# Patient Record
Sex: MALE | Race: BLACK OR AFRICAN AMERICAN | NOT HISPANIC OR LATINO | Employment: OTHER | URBAN - METROPOLITAN AREA
[De-identification: names, ages, dates, MRNs, and addresses within clinical notes are randomized per-mention and may not be internally consistent; named-entity substitution may affect disease eponyms.]

---

## 2022-07-23 ENCOUNTER — HOSPITAL ENCOUNTER (INPATIENT)
Facility: HOSPITAL | Age: 77
LOS: 13 days | Discharge: HOME | End: 2022-08-05
Attending: PHYSICAL MEDICINE & REHABILITATION | Admitting: PHYSICAL MEDICINE & REHABILITATION

## 2022-07-23 ENCOUNTER — APPOINTMENT (OUTPATIENT)
Dept: OTHER | Facility: HOSPITAL | Age: 77
End: 2022-07-23

## 2022-07-23 DIAGNOSIS — I63.50 RIGHT PONTINE STROKE: ICD-10-CM

## 2022-07-23 DIAGNOSIS — Z09 FOLLOW-UP EXAM: ICD-10-CM

## 2022-07-23 DIAGNOSIS — R26.89 IMPAIRED GAIT AND MOBILITY: Primary | ICD-10-CM

## 2022-07-23 DIAGNOSIS — M10.9 ACUTE GOUT OF LEFT HAND, UNSPECIFIED CAUSE: ICD-10-CM

## 2022-07-23 LAB — SARS-COV-2 ORF1AB RESP QL NAA+PROBE: NOT DETECTED

## 2022-07-23 PROCEDURE — 94640 AIRWAY INHALATION TREATMENT: CPT

## 2022-07-23 PROCEDURE — 94799 UNLISTED PULMONARY SVC/PX: CPT

## 2022-07-23 PROCEDURE — 94664 DEMO&/EVAL PT USE INHALER: CPT

## 2022-07-23 PROCEDURE — U0004 COV-19 TEST NON-CDC HGH THRU: HCPCS | Performed by: PHYSICAL MEDICINE & REHABILITATION

## 2022-07-23 PROCEDURE — U0005 INFEC AGEN DETEC AMPLI PROBE: HCPCS | Performed by: PHYSICAL MEDICINE & REHABILITATION

## 2022-07-23 RX ORDER — NAPROXEN 500 MG/1
500 TABLET ORAL 2 TIMES DAILY WITH MEALS
COMMUNITY
End: 2022-08-05 | Stop reason: HOSPADM

## 2022-07-23 RX ORDER — LORATADINE 10 MG/1
10 CAPSULE, LIQUID FILLED ORAL DAILY
COMMUNITY

## 2022-07-23 RX ORDER — TAMSULOSIN HYDROCHLORIDE 0.4 MG/1
1 CAPSULE ORAL DAILY
COMMUNITY

## 2022-07-23 RX ORDER — ASPIRIN 81 MG/1
81 TABLET ORAL DAILY
Status: DISCONTINUED | OUTPATIENT
Start: 2022-07-24 | End: 2022-08-05 | Stop reason: HOSPADM

## 2022-07-23 RX ORDER — TAMSULOSIN HYDROCHLORIDE 0.4 MG/1
0.4 CAPSULE ORAL DAILY
Status: DISCONTINUED | OUTPATIENT
Start: 2022-07-24 | End: 2022-08-05 | Stop reason: HOSPADM

## 2022-07-23 RX ORDER — HYDROCODONE BITARTRATE AND ACETAMINOPHEN 5; 325 MG/1; MG/1
1 TABLET ORAL EVERY 4 HOURS PRN
Status: DISCONTINUED | OUTPATIENT
Start: 2022-07-23 | End: 2022-08-05 | Stop reason: HOSPADM

## 2022-07-23 RX ORDER — BISACODYL 10 MG
10 SUPPOSITORY, RECTAL RECTAL DAILY PRN
Status: DISCONTINUED | OUTPATIENT
Start: 2022-07-23 | End: 2022-08-05 | Stop reason: HOSPADM

## 2022-07-23 RX ORDER — BUDESONIDE AND FORMOTEROL FUMARATE DIHYDRATE 160; 4.5 UG/1; UG/1
2 AEROSOL RESPIRATORY (INHALATION)
Status: DISCONTINUED | OUTPATIENT
Start: 2022-07-23 | End: 2022-08-05 | Stop reason: HOSPADM

## 2022-07-23 RX ORDER — ACETAMINOPHEN 500 MG
500 TABLET ORAL EVERY 6 HOURS PRN
Status: DISCONTINUED | OUTPATIENT
Start: 2022-07-23 | End: 2022-08-04

## 2022-07-23 RX ORDER — ATORVASTATIN CALCIUM 80 MG/1
80 TABLET, FILM COATED ORAL NIGHTLY
Status: DISCONTINUED | OUTPATIENT
Start: 2022-07-23 | End: 2022-07-27

## 2022-07-23 RX ADMIN — ATORVASTATIN CALCIUM 80 MG: 80 TABLET, FILM COATED ORAL at 21:18

## 2022-07-23 RX ADMIN — BUDESONIDE AND FORMOTEROL FUMARATE DIHYDRATE 2 PUFF: 160; 4.5 AEROSOL RESPIRATORY (INHALATION) at 19:28

## 2022-07-24 ENCOUNTER — APPOINTMENT (OUTPATIENT)
Dept: GENERAL RADIOLOGY | Facility: HOSPITAL | Age: 77
End: 2022-07-24

## 2022-07-24 PROCEDURE — 97110 THERAPEUTIC EXERCISES: CPT

## 2022-07-24 PROCEDURE — 94799 UNLISTED PULMONARY SVC/PX: CPT

## 2022-07-24 PROCEDURE — 97162 PT EVAL MOD COMPLEX 30 MIN: CPT

## 2022-07-24 PROCEDURE — 73130 X-RAY EXAM OF HAND: CPT

## 2022-07-24 PROCEDURE — 94664 DEMO&/EVAL PT USE INHALER: CPT

## 2022-07-24 RX ORDER — AMOXICILLIN 250 MG
2 CAPSULE ORAL DAILY
Status: DISCONTINUED | OUTPATIENT
Start: 2022-07-24 | End: 2022-08-05 | Stop reason: HOSPADM

## 2022-07-24 RX ORDER — DOCUSATE SODIUM 100 MG/1
100 CAPSULE, LIQUID FILLED ORAL 2 TIMES DAILY
Status: DISCONTINUED | OUTPATIENT
Start: 2022-07-24 | End: 2022-08-05 | Stop reason: HOSPADM

## 2022-07-24 RX ADMIN — ATORVASTATIN CALCIUM 80 MG: 80 TABLET, FILM COATED ORAL at 20:48

## 2022-07-24 RX ADMIN — HYDROCODONE BITARTRATE AND ACETAMINOPHEN 1 TABLET: 5; 325 TABLET ORAL at 08:04

## 2022-07-24 RX ADMIN — ACETAMINOPHEN 500 MG: 500 TABLET ORAL at 07:22

## 2022-07-24 RX ADMIN — DICLOFENAC SODIUM 2 G: 10 GEL TOPICAL at 20:49

## 2022-07-24 RX ADMIN — BUDESONIDE AND FORMOTEROL FUMARATE DIHYDRATE 2 PUFF: 160; 4.5 AEROSOL RESPIRATORY (INHALATION) at 20:13

## 2022-07-24 RX ADMIN — DOCUSATE SODIUM 50MG AND SENNOSIDES 8.6MG 2 TABLET: 8.6; 5 TABLET, FILM COATED ORAL at 16:36

## 2022-07-24 RX ADMIN — TAMSULOSIN HYDROCHLORIDE 0.4 MG: 0.4 CAPSULE ORAL at 08:04

## 2022-07-24 RX ADMIN — HYDROCODONE BITARTRATE AND ACETAMINOPHEN 1 TABLET: 5; 325 TABLET ORAL at 22:05

## 2022-07-24 RX ADMIN — BUDESONIDE AND FORMOTEROL FUMARATE DIHYDRATE 2 PUFF: 160; 4.5 AEROSOL RESPIRATORY (INHALATION) at 07:35

## 2022-07-24 RX ADMIN — ASPIRIN 81 MG: 81 TABLET, COATED ORAL at 08:04

## 2022-07-24 RX ADMIN — DOCUSATE SODIUM 100 MG: 100 CAPSULE, LIQUID FILLED ORAL at 20:48

## 2022-07-24 RX ADMIN — HYDROCODONE BITARTRATE AND ACETAMINOPHEN 1 TABLET: 5; 325 TABLET ORAL at 18:03

## 2022-07-25 ENCOUNTER — APPOINTMENT (OUTPATIENT)
Dept: GENERAL RADIOLOGY | Facility: HOSPITAL | Age: 77
End: 2022-07-25

## 2022-07-25 ENCOUNTER — APPOINTMENT (OUTPATIENT)
Dept: CT IMAGING | Facility: HOSPITAL | Age: 77
End: 2022-07-25

## 2022-07-25 LAB
25(OH)D3 SERPL-MCNC: 20 NG/ML (ref 30–100)
ANION GAP SERPL CALCULATED.3IONS-SCNC: 7.9 MMOL/L (ref 5–15)
BASOPHILS # BLD AUTO: 0.04 10*3/MM3 (ref 0–0.2)
BASOPHILS NFR BLD AUTO: 0.8 % (ref 0–1.5)
BUN SERPL-MCNC: 9 MG/DL (ref 8–23)
BUN/CREAT SERPL: 9.9 (ref 7–25)
CALCIUM SPEC-SCNC: 9.3 MG/DL (ref 8.6–10.5)
CHLORIDE SERPL-SCNC: 104 MMOL/L (ref 98–107)
CO2 SERPL-SCNC: 27.1 MMOL/L (ref 22–29)
CREAT SERPL-MCNC: 0.91 MG/DL (ref 0.76–1.27)
DEPRECATED RDW RBC AUTO: 44.7 FL (ref 37–54)
EGFRCR SERPLBLD CKD-EPI 2021: 86.8 ML/MIN/1.73
EOSINOPHIL # BLD AUTO: 0.18 10*3/MM3 (ref 0–0.4)
EOSINOPHIL NFR BLD AUTO: 3.8 % (ref 0.3–6.2)
ERYTHROCYTE [DISTWIDTH] IN BLOOD BY AUTOMATED COUNT: 13.3 % (ref 12.3–15.4)
GLUCOSE SERPL-MCNC: 76 MG/DL (ref 65–99)
HCT VFR BLD AUTO: 47.6 % (ref 37.5–51)
HGB BLD-MCNC: 15.6 G/DL (ref 13–17.7)
IMM GRANULOCYTES # BLD AUTO: 0.02 10*3/MM3 (ref 0–0.05)
IMM GRANULOCYTES NFR BLD AUTO: 0.4 % (ref 0–0.5)
LYMPHOCYTES # BLD AUTO: 1.87 10*3/MM3 (ref 0.7–3.1)
LYMPHOCYTES NFR BLD AUTO: 39.5 % (ref 19.6–45.3)
MCH RBC QN AUTO: 29.5 PG (ref 26.6–33)
MCHC RBC AUTO-ENTMCNC: 32.8 G/DL (ref 31.5–35.7)
MCV RBC AUTO: 90.2 FL (ref 79–97)
MONOCYTES # BLD AUTO: 0.62 10*3/MM3 (ref 0.1–0.9)
MONOCYTES NFR BLD AUTO: 13.1 % (ref 5–12)
NEUTROPHILS NFR BLD AUTO: 2.01 10*3/MM3 (ref 1.7–7)
NEUTROPHILS NFR BLD AUTO: 42.4 % (ref 42.7–76)
NRBC BLD AUTO-RTO: 0 /100 WBC (ref 0–0.2)
PLATELET # BLD AUTO: 240 10*3/MM3 (ref 140–450)
PMV BLD AUTO: 10.1 FL (ref 6–12)
POTASSIUM SERPL-SCNC: 3.9 MMOL/L (ref 3.5–5.2)
RBC # BLD AUTO: 5.28 10*6/MM3 (ref 4.14–5.8)
SODIUM SERPL-SCNC: 139 MMOL/L (ref 136–145)
WBC NRBC COR # BLD: 4.74 10*3/MM3 (ref 3.4–10.8)

## 2022-07-25 PROCEDURE — 97535 SELF CARE MNGMENT TRAINING: CPT

## 2022-07-25 PROCEDURE — 94760 N-INVAS EAR/PLS OXIMETRY 1: CPT

## 2022-07-25 PROCEDURE — 94664 DEMO&/EVAL PT USE INHALER: CPT

## 2022-07-25 PROCEDURE — 73502 X-RAY EXAM HIP UNI 2-3 VIEWS: CPT

## 2022-07-25 PROCEDURE — 94761 N-INVAS EAR/PLS OXIMETRY MLT: CPT

## 2022-07-25 PROCEDURE — 97166 OT EVAL MOD COMPLEX 45 MIN: CPT

## 2022-07-25 PROCEDURE — 94799 UNLISTED PULMONARY SVC/PX: CPT

## 2022-07-25 PROCEDURE — 80048 BASIC METABOLIC PNL TOTAL CA: CPT | Performed by: STUDENT IN AN ORGANIZED HEALTH CARE EDUCATION/TRAINING PROGRAM

## 2022-07-25 PROCEDURE — 97110 THERAPEUTIC EXERCISES: CPT

## 2022-07-25 PROCEDURE — 96125 COGNITIVE TEST BY HC PRO: CPT

## 2022-07-25 PROCEDURE — 25010000002 ENOXAPARIN PER 10 MG: Performed by: PHYSICAL MEDICINE & REHABILITATION

## 2022-07-25 PROCEDURE — 82306 VITAMIN D 25 HYDROXY: CPT | Performed by: STUDENT IN AN ORGANIZED HEALTH CARE EDUCATION/TRAINING PROGRAM

## 2022-07-25 PROCEDURE — 85025 COMPLETE CBC W/AUTO DIFF WBC: CPT | Performed by: STUDENT IN AN ORGANIZED HEALTH CARE EDUCATION/TRAINING PROGRAM

## 2022-07-25 PROCEDURE — 73200 CT UPPER EXTREMITY W/O DYE: CPT

## 2022-07-25 RX ORDER — ERGOCALCIFEROL 1.25 MG/1
50000 CAPSULE ORAL
Status: DISCONTINUED | OUTPATIENT
Start: 2022-07-25 | End: 2022-08-05 | Stop reason: HOSPADM

## 2022-07-25 RX ORDER — ENOXAPARIN SODIUM 100 MG/ML
40 INJECTION SUBCUTANEOUS NIGHTLY
Status: DISCONTINUED | OUTPATIENT
Start: 2022-07-25 | End: 2022-08-05 | Stop reason: HOSPADM

## 2022-07-25 RX ADMIN — DICLOFENAC SODIUM 2 G: 10 GEL TOPICAL at 17:40

## 2022-07-25 RX ADMIN — HYDROCODONE BITARTRATE AND ACETAMINOPHEN 1 TABLET: 5; 325 TABLET ORAL at 22:54

## 2022-07-25 RX ADMIN — ASPIRIN 81 MG: 81 TABLET, COATED ORAL at 07:23

## 2022-07-25 RX ADMIN — HYDROCODONE BITARTRATE AND ACETAMINOPHEN 1 TABLET: 5; 325 TABLET ORAL at 07:23

## 2022-07-25 RX ADMIN — HYDROCODONE BITARTRATE AND ACETAMINOPHEN 1 TABLET: 5; 325 TABLET ORAL at 18:54

## 2022-07-25 RX ADMIN — DICLOFENAC SODIUM 2 G: 10 GEL TOPICAL at 20:16

## 2022-07-25 RX ADMIN — TAMSULOSIN HYDROCHLORIDE 0.4 MG: 0.4 CAPSULE ORAL at 07:24

## 2022-07-25 RX ADMIN — DOCUSATE SODIUM 100 MG: 100 CAPSULE, LIQUID FILLED ORAL at 20:16

## 2022-07-25 RX ADMIN — BUDESONIDE AND FORMOTEROL FUMARATE DIHYDRATE 2 PUFF: 160; 4.5 AEROSOL RESPIRATORY (INHALATION) at 07:41

## 2022-07-25 RX ADMIN — ENOXAPARIN SODIUM 40 MG: 100 INJECTION SUBCUTANEOUS at 20:16

## 2022-07-25 RX ADMIN — DOCUSATE SODIUM 50MG AND SENNOSIDES 8.6MG 2 TABLET: 8.6; 5 TABLET, FILM COATED ORAL at 07:24

## 2022-07-25 RX ADMIN — DICLOFENAC SODIUM 2 G: 10 GEL TOPICAL at 08:35

## 2022-07-25 RX ADMIN — BUDESONIDE AND FORMOTEROL FUMARATE DIHYDRATE 2 PUFF: 160; 4.5 AEROSOL RESPIRATORY (INHALATION) at 19:47

## 2022-07-25 RX ADMIN — DOCUSATE SODIUM 100 MG: 100 CAPSULE, LIQUID FILLED ORAL at 07:23

## 2022-07-25 RX ADMIN — ATORVASTATIN CALCIUM 80 MG: 80 TABLET, FILM COATED ORAL at 20:16

## 2022-07-25 RX ADMIN — DICLOFENAC SODIUM 2 G: 10 GEL TOPICAL at 11:43

## 2022-07-25 RX ADMIN — ERGOCALCIFEROL 50000 UNITS: 1.25 CAPSULE ORAL at 16:28

## 2022-07-26 LAB
URATE SERPL-MCNC: 6.6 MG/DL (ref 3.4–7)
VIT B12 BLD-MCNC: 659 PG/ML (ref 211–946)

## 2022-07-26 PROCEDURE — 97129 THER IVNTJ 1ST 15 MIN: CPT

## 2022-07-26 PROCEDURE — 94799 UNLISTED PULMONARY SVC/PX: CPT

## 2022-07-26 PROCEDURE — 84550 ASSAY OF BLOOD/URIC ACID: CPT | Performed by: PHYSICAL MEDICINE & REHABILITATION

## 2022-07-26 PROCEDURE — 25010000002 ENOXAPARIN PER 10 MG: Performed by: PHYSICAL MEDICINE & REHABILITATION

## 2022-07-26 PROCEDURE — 94664 DEMO&/EVAL PT USE INHALER: CPT

## 2022-07-26 PROCEDURE — 97535 SELF CARE MNGMENT TRAINING: CPT

## 2022-07-26 PROCEDURE — 97130 THER IVNTJ EA ADDL 15 MIN: CPT

## 2022-07-26 PROCEDURE — 97110 THERAPEUTIC EXERCISES: CPT

## 2022-07-26 PROCEDURE — 82607 VITAMIN B-12: CPT | Performed by: PHYSICAL MEDICINE & REHABILITATION

## 2022-07-26 PROCEDURE — 97112 NEUROMUSCULAR REEDUCATION: CPT

## 2022-07-26 RX ADMIN — HYDROCODONE BITARTRATE AND ACETAMINOPHEN 1 TABLET: 5; 325 TABLET ORAL at 08:50

## 2022-07-26 RX ADMIN — ENOXAPARIN SODIUM 40 MG: 100 INJECTION SUBCUTANEOUS at 20:08

## 2022-07-26 RX ADMIN — ASPIRIN 81 MG: 81 TABLET, COATED ORAL at 08:49

## 2022-07-26 RX ADMIN — BUDESONIDE AND FORMOTEROL FUMARATE DIHYDRATE 2 PUFF: 160; 4.5 AEROSOL RESPIRATORY (INHALATION) at 07:15

## 2022-07-26 RX ADMIN — ATORVASTATIN CALCIUM 80 MG: 80 TABLET, FILM COATED ORAL at 20:08

## 2022-07-26 RX ADMIN — DICLOFENAC SODIUM 2 G: 10 GEL TOPICAL at 11:26

## 2022-07-26 RX ADMIN — DICLOFENAC SODIUM 2 G: 10 GEL TOPICAL at 20:10

## 2022-07-26 RX ADMIN — DOCUSATE SODIUM 100 MG: 100 CAPSULE, LIQUID FILLED ORAL at 20:08

## 2022-07-26 RX ADMIN — TAMSULOSIN HYDROCHLORIDE 0.4 MG: 0.4 CAPSULE ORAL at 08:49

## 2022-07-26 RX ADMIN — BUDESONIDE AND FORMOTEROL FUMARATE DIHYDRATE 2 PUFF: 160; 4.5 AEROSOL RESPIRATORY (INHALATION) at 19:38

## 2022-07-26 RX ADMIN — DICLOFENAC SODIUM 2 G: 10 GEL TOPICAL at 08:50

## 2022-07-27 LAB
BASOPHILS # BLD AUTO: 0.04 10*3/MM3 (ref 0–0.2)
BASOPHILS NFR BLD AUTO: 0.6 % (ref 0–1.5)
CRP SERPL-MCNC: 10.28 MG/DL (ref 0–0.5)
DEPRECATED RDW RBC AUTO: 42.3 FL (ref 37–54)
EOSINOPHIL # BLD AUTO: 0.14 10*3/MM3 (ref 0–0.4)
EOSINOPHIL NFR BLD AUTO: 2.1 % (ref 0.3–6.2)
ERYTHROCYTE [DISTWIDTH] IN BLOOD BY AUTOMATED COUNT: 13.4 % (ref 12.3–15.4)
ERYTHROCYTE [SEDIMENTATION RATE] IN BLOOD: 53 MM/HR (ref 0–20)
HCT VFR BLD AUTO: 46.6 % (ref 37.5–51)
HGB BLD-MCNC: 15.9 G/DL (ref 13–17.7)
IMM GRANULOCYTES # BLD AUTO: 0.01 10*3/MM3 (ref 0–0.05)
IMM GRANULOCYTES NFR BLD AUTO: 0.1 % (ref 0–0.5)
IRON 24H UR-MRATE: 29 MCG/DL (ref 59–158)
IRON SATN MFR SERPL: 11 % (ref 20–50)
LYMPHOCYTES # BLD AUTO: 1.82 10*3/MM3 (ref 0.7–3.1)
LYMPHOCYTES NFR BLD AUTO: 27 % (ref 19.6–45.3)
MAGNESIUM SERPL-MCNC: 1.9 MG/DL (ref 1.6–2.4)
MCH RBC QN AUTO: 29.9 PG (ref 26.6–33)
MCHC RBC AUTO-ENTMCNC: 34.1 G/DL (ref 31.5–35.7)
MCV RBC AUTO: 87.8 FL (ref 79–97)
MONOCYTES # BLD AUTO: 1 10*3/MM3 (ref 0.1–0.9)
MONOCYTES NFR BLD AUTO: 14.8 % (ref 5–12)
NEUTROPHILS NFR BLD AUTO: 3.74 10*3/MM3 (ref 1.7–7)
NEUTROPHILS NFR BLD AUTO: 55.4 % (ref 42.7–76)
NRBC BLD AUTO-RTO: 0 /100 WBC (ref 0–0.2)
PLATELET # BLD AUTO: 274 10*3/MM3 (ref 140–450)
PMV BLD AUTO: 9.7 FL (ref 6–12)
RBC # BLD AUTO: 5.31 10*6/MM3 (ref 4.14–5.8)
TIBC SERPL-MCNC: 276 MCG/DL (ref 298–536)
TRANSFERRIN SERPL-MCNC: 185 MG/DL (ref 200–360)
WBC NRBC COR # BLD: 6.75 10*3/MM3 (ref 3.4–10.8)

## 2022-07-27 PROCEDURE — 86140 C-REACTIVE PROTEIN: CPT | Performed by: PHYSICAL MEDICINE & REHABILITATION

## 2022-07-27 PROCEDURE — 94799 UNLISTED PULMONARY SVC/PX: CPT

## 2022-07-27 PROCEDURE — 97129 THER IVNTJ 1ST 15 MIN: CPT

## 2022-07-27 PROCEDURE — 84466 ASSAY OF TRANSFERRIN: CPT | Performed by: PHYSICAL MEDICINE & REHABILITATION

## 2022-07-27 PROCEDURE — 25010000002 ENOXAPARIN PER 10 MG: Performed by: PHYSICAL MEDICINE & REHABILITATION

## 2022-07-27 PROCEDURE — 83735 ASSAY OF MAGNESIUM: CPT | Performed by: PHYSICAL MEDICINE & REHABILITATION

## 2022-07-27 PROCEDURE — 97130 THER IVNTJ EA ADDL 15 MIN: CPT

## 2022-07-27 PROCEDURE — 83540 ASSAY OF IRON: CPT | Performed by: PHYSICAL MEDICINE & REHABILITATION

## 2022-07-27 PROCEDURE — 85025 COMPLETE CBC W/AUTO DIFF WBC: CPT | Performed by: PHYSICAL MEDICINE & REHABILITATION

## 2022-07-27 PROCEDURE — 97535 SELF CARE MNGMENT TRAINING: CPT

## 2022-07-27 PROCEDURE — 85652 RBC SED RATE AUTOMATED: CPT | Performed by: PHYSICAL MEDICINE & REHABILITATION

## 2022-07-27 PROCEDURE — 97110 THERAPEUTIC EXERCISES: CPT

## 2022-07-27 PROCEDURE — 94664 DEMO&/EVAL PT USE INHALER: CPT

## 2022-07-27 RX ORDER — ATORVASTATIN CALCIUM 80 MG/1
80 TABLET, FILM COATED ORAL NIGHTLY
Status: DISCONTINUED | OUTPATIENT
Start: 2022-08-02 | End: 2022-08-01

## 2022-07-27 RX ORDER — COLCHICINE 0.6 MG/1
0.6 TABLET ORAL EVERY 6 HOURS
Status: COMPLETED | OUTPATIENT
Start: 2022-07-27 | End: 2022-07-27

## 2022-07-27 RX ORDER — COLCHICINE 0.6 MG/1
0.6 TABLET ORAL DAILY
Status: COMPLETED | OUTPATIENT
Start: 2022-07-28 | End: 2022-08-01

## 2022-07-27 RX ADMIN — ASPIRIN 81 MG: 81 TABLET, COATED ORAL at 07:42

## 2022-07-27 RX ADMIN — BUDESONIDE AND FORMOTEROL FUMARATE DIHYDRATE 2 PUFF: 160; 4.5 AEROSOL RESPIRATORY (INHALATION) at 07:25

## 2022-07-27 RX ADMIN — HYDROCODONE BITARTRATE AND ACETAMINOPHEN 1 TABLET: 5; 325 TABLET ORAL at 20:37

## 2022-07-27 RX ADMIN — DICLOFENAC SODIUM 2 G: 10 GEL TOPICAL at 18:16

## 2022-07-27 RX ADMIN — HYDROCODONE BITARTRATE AND ACETAMINOPHEN 1 TABLET: 5; 325 TABLET ORAL at 07:42

## 2022-07-27 RX ADMIN — ENOXAPARIN SODIUM 40 MG: 100 INJECTION SUBCUTANEOUS at 20:37

## 2022-07-27 RX ADMIN — TAMSULOSIN HYDROCHLORIDE 0.4 MG: 0.4 CAPSULE ORAL at 07:43

## 2022-07-27 RX ADMIN — DICLOFENAC SODIUM 2 G: 10 GEL TOPICAL at 20:38

## 2022-07-27 RX ADMIN — COLCHICINE 0.6 MG: 0.6 TABLET, FILM COATED ORAL at 12:37

## 2022-07-27 RX ADMIN — COLCHICINE 0.6 MG: 0.6 TABLET, FILM COATED ORAL at 20:37

## 2022-07-27 RX ADMIN — BUDESONIDE AND FORMOTEROL FUMARATE DIHYDRATE 2 PUFF: 160; 4.5 AEROSOL RESPIRATORY (INHALATION) at 19:47

## 2022-07-27 RX ADMIN — DOCUSATE SODIUM 100 MG: 100 CAPSULE, LIQUID FILLED ORAL at 20:37

## 2022-07-28 LAB
ANION GAP SERPL CALCULATED.3IONS-SCNC: 10.3 MMOL/L (ref 5–15)
BASOPHILS # BLD AUTO: 0.06 10*3/MM3 (ref 0–0.2)
BASOPHILS NFR BLD AUTO: 1 % (ref 0–1.5)
BUN SERPL-MCNC: 12 MG/DL (ref 8–23)
BUN/CREAT SERPL: 12.4 (ref 7–25)
CALCIUM SPEC-SCNC: 9.1 MG/DL (ref 8.6–10.5)
CHLORIDE SERPL-SCNC: 103 MMOL/L (ref 98–107)
CO2 SERPL-SCNC: 25.7 MMOL/L (ref 22–29)
CREAT SERPL-MCNC: 0.97 MG/DL (ref 0.76–1.27)
DEPRECATED RDW RBC AUTO: 41.8 FL (ref 37–54)
EGFRCR SERPLBLD CKD-EPI 2021: 80.4 ML/MIN/1.73
EOSINOPHIL # BLD AUTO: 0.17 10*3/MM3 (ref 0–0.4)
EOSINOPHIL NFR BLD AUTO: 2.8 % (ref 0.3–6.2)
ERYTHROCYTE [DISTWIDTH] IN BLOOD BY AUTOMATED COUNT: 13.2 % (ref 12.3–15.4)
GLUCOSE SERPL-MCNC: 92 MG/DL (ref 65–99)
HCT VFR BLD AUTO: 45.6 % (ref 37.5–51)
HGB BLD-MCNC: 15.5 G/DL (ref 13–17.7)
IMM GRANULOCYTES # BLD AUTO: 0.02 10*3/MM3 (ref 0–0.05)
IMM GRANULOCYTES NFR BLD AUTO: 0.3 % (ref 0–0.5)
LYMPHOCYTES # BLD AUTO: 2.21 10*3/MM3 (ref 0.7–3.1)
LYMPHOCYTES NFR BLD AUTO: 35.9 % (ref 19.6–45.3)
MCH RBC QN AUTO: 29.8 PG (ref 26.6–33)
MCHC RBC AUTO-ENTMCNC: 34 G/DL (ref 31.5–35.7)
MCV RBC AUTO: 87.5 FL (ref 79–97)
MONOCYTES # BLD AUTO: 0.97 10*3/MM3 (ref 0.1–0.9)
MONOCYTES NFR BLD AUTO: 15.7 % (ref 5–12)
NEUTROPHILS NFR BLD AUTO: 2.73 10*3/MM3 (ref 1.7–7)
NEUTROPHILS NFR BLD AUTO: 44.3 % (ref 42.7–76)
NRBC BLD AUTO-RTO: 0 /100 WBC (ref 0–0.2)
PLATELET # BLD AUTO: 293 10*3/MM3 (ref 140–450)
PMV BLD AUTO: 9.9 FL (ref 6–12)
POTASSIUM SERPL-SCNC: 4.1 MMOL/L (ref 3.5–5.2)
RBC # BLD AUTO: 5.21 10*6/MM3 (ref 4.14–5.8)
SODIUM SERPL-SCNC: 139 MMOL/L (ref 136–145)
T4 FREE SERPL-MCNC: 1.45 NG/DL (ref 0.93–1.7)
TSH SERPL DL<=0.05 MIU/L-ACNC: 7.11 UIU/ML (ref 0.27–4.2)
WBC NRBC COR # BLD: 6.16 10*3/MM3 (ref 3.4–10.8)

## 2022-07-28 PROCEDURE — 80048 BASIC METABOLIC PNL TOTAL CA: CPT | Performed by: PHYSICAL MEDICINE & REHABILITATION

## 2022-07-28 PROCEDURE — 97110 THERAPEUTIC EXERCISES: CPT

## 2022-07-28 PROCEDURE — 97129 THER IVNTJ 1ST 15 MIN: CPT

## 2022-07-28 PROCEDURE — 25010000002 ENOXAPARIN PER 10 MG: Performed by: PHYSICAL MEDICINE & REHABILITATION

## 2022-07-28 PROCEDURE — 85025 COMPLETE CBC W/AUTO DIFF WBC: CPT | Performed by: PHYSICAL MEDICINE & REHABILITATION

## 2022-07-28 PROCEDURE — 84439 ASSAY OF FREE THYROXINE: CPT | Performed by: PHYSICAL MEDICINE & REHABILITATION

## 2022-07-28 PROCEDURE — 84443 ASSAY THYROID STIM HORMONE: CPT | Performed by: PHYSICAL MEDICINE & REHABILITATION

## 2022-07-28 PROCEDURE — 97130 THER IVNTJ EA ADDL 15 MIN: CPT

## 2022-07-28 PROCEDURE — 97535 SELF CARE MNGMENT TRAINING: CPT

## 2022-07-28 PROCEDURE — 94799 UNLISTED PULMONARY SVC/PX: CPT

## 2022-07-28 PROCEDURE — 97530 THERAPEUTIC ACTIVITIES: CPT

## 2022-07-28 RX ADMIN — BUDESONIDE AND FORMOTEROL FUMARATE DIHYDRATE 2 PUFF: 160; 4.5 AEROSOL RESPIRATORY (INHALATION) at 07:22

## 2022-07-28 RX ADMIN — ENOXAPARIN SODIUM 40 MG: 100 INJECTION SUBCUTANEOUS at 20:20

## 2022-07-28 RX ADMIN — DICLOFENAC SODIUM 2 G: 10 GEL TOPICAL at 05:30

## 2022-07-28 RX ADMIN — DICLOFENAC SODIUM 2 G: 10 GEL TOPICAL at 11:47

## 2022-07-28 RX ADMIN — HYDROCODONE BITARTRATE AND ACETAMINOPHEN 1 TABLET: 5; 325 TABLET ORAL at 20:20

## 2022-07-28 RX ADMIN — HYDROCODONE BITARTRATE AND ACETAMINOPHEN 1 TABLET: 5; 325 TABLET ORAL at 08:10

## 2022-07-28 RX ADMIN — DICLOFENAC SODIUM 2 G: 10 GEL TOPICAL at 17:09

## 2022-07-28 RX ADMIN — DICLOFENAC SODIUM 2 G: 10 GEL TOPICAL at 20:21

## 2022-07-28 RX ADMIN — BUDESONIDE AND FORMOTEROL FUMARATE DIHYDRATE 2 PUFF: 160; 4.5 AEROSOL RESPIRATORY (INHALATION) at 20:14

## 2022-07-28 RX ADMIN — COLCHICINE 0.6 MG: 0.6 TABLET, FILM COATED ORAL at 10:21

## 2022-07-28 RX ADMIN — DICLOFENAC SODIUM 2 G: 10 GEL TOPICAL at 08:10

## 2022-07-28 RX ADMIN — ASPIRIN 81 MG: 81 TABLET, COATED ORAL at 08:10

## 2022-07-28 RX ADMIN — TAMSULOSIN HYDROCHLORIDE 0.4 MG: 0.4 CAPSULE ORAL at 08:10

## 2022-07-29 PROCEDURE — 97110 THERAPEUTIC EXERCISES: CPT

## 2022-07-29 PROCEDURE — 97130 THER IVNTJ EA ADDL 15 MIN: CPT | Performed by: SPEECH-LANGUAGE PATHOLOGIST

## 2022-07-29 PROCEDURE — 97129 THER IVNTJ 1ST 15 MIN: CPT | Performed by: SPEECH-LANGUAGE PATHOLOGIST

## 2022-07-29 PROCEDURE — 25010000002 ENOXAPARIN PER 10 MG: Performed by: PHYSICAL MEDICINE & REHABILITATION

## 2022-07-29 PROCEDURE — 97535 SELF CARE MNGMENT TRAINING: CPT

## 2022-07-29 PROCEDURE — 94799 UNLISTED PULMONARY SVC/PX: CPT

## 2022-07-29 RX ADMIN — TAMSULOSIN HYDROCHLORIDE 0.4 MG: 0.4 CAPSULE ORAL at 08:10

## 2022-07-29 RX ADMIN — ASPIRIN 81 MG: 81 TABLET, COATED ORAL at 08:11

## 2022-07-29 RX ADMIN — BUDESONIDE AND FORMOTEROL FUMARATE DIHYDRATE 2 PUFF: 160; 4.5 AEROSOL RESPIRATORY (INHALATION) at 07:47

## 2022-07-29 RX ADMIN — ENOXAPARIN SODIUM 40 MG: 100 INJECTION SUBCUTANEOUS at 20:26

## 2022-07-29 RX ADMIN — BUDESONIDE AND FORMOTEROL FUMARATE DIHYDRATE 2 PUFF: 160; 4.5 AEROSOL RESPIRATORY (INHALATION) at 19:35

## 2022-07-29 RX ADMIN — DICLOFENAC SODIUM 2 G: 10 GEL TOPICAL at 17:48

## 2022-07-29 RX ADMIN — HYDROCODONE BITARTRATE AND ACETAMINOPHEN 1 TABLET: 5; 325 TABLET ORAL at 20:26

## 2022-07-29 RX ADMIN — COLCHICINE 0.6 MG: 0.6 TABLET, FILM COATED ORAL at 08:10

## 2022-07-29 RX ADMIN — DICLOFENAC SODIUM 2 G: 10 GEL TOPICAL at 20:27

## 2022-07-29 RX ADMIN — DICLOFENAC SODIUM 2 G: 10 GEL TOPICAL at 08:11

## 2022-07-29 RX ADMIN — HYDROCODONE BITARTRATE AND ACETAMINOPHEN 1 TABLET: 5; 325 TABLET ORAL at 08:10

## 2022-07-29 RX ADMIN — DICLOFENAC SODIUM 2 G: 10 GEL TOPICAL at 12:03

## 2022-07-30 PROCEDURE — 94799 UNLISTED PULMONARY SVC/PX: CPT

## 2022-07-30 PROCEDURE — 25010000002 ENOXAPARIN PER 10 MG: Performed by: PHYSICAL MEDICINE & REHABILITATION

## 2022-07-30 PROCEDURE — 97110 THERAPEUTIC EXERCISES: CPT

## 2022-07-30 RX ADMIN — DICLOFENAC SODIUM 2 G: 10 GEL TOPICAL at 17:23

## 2022-07-30 RX ADMIN — ASPIRIN 81 MG: 81 TABLET, COATED ORAL at 08:52

## 2022-07-30 RX ADMIN — TAMSULOSIN HYDROCHLORIDE 0.4 MG: 0.4 CAPSULE ORAL at 08:52

## 2022-07-30 RX ADMIN — DICLOFENAC SODIUM 2 G: 10 GEL TOPICAL at 07:37

## 2022-07-30 RX ADMIN — COLCHICINE 0.6 MG: 0.6 TABLET, FILM COATED ORAL at 08:52

## 2022-07-30 RX ADMIN — BUDESONIDE AND FORMOTEROL FUMARATE DIHYDRATE 2 PUFF: 160; 4.5 AEROSOL RESPIRATORY (INHALATION) at 20:13

## 2022-07-30 RX ADMIN — HYDROCODONE BITARTRATE AND ACETAMINOPHEN 1 TABLET: 5; 325 TABLET ORAL at 20:16

## 2022-07-30 RX ADMIN — DICLOFENAC SODIUM 2 G: 10 GEL TOPICAL at 11:48

## 2022-07-30 RX ADMIN — BUDESONIDE AND FORMOTEROL FUMARATE DIHYDRATE 2 PUFF: 160; 4.5 AEROSOL RESPIRATORY (INHALATION) at 07:00

## 2022-07-30 RX ADMIN — ENOXAPARIN SODIUM 40 MG: 100 INJECTION SUBCUTANEOUS at 20:16

## 2022-07-30 RX ADMIN — DICLOFENAC SODIUM 2 G: 10 GEL TOPICAL at 20:16

## 2022-07-31 PROCEDURE — 94799 UNLISTED PULMONARY SVC/PX: CPT

## 2022-07-31 PROCEDURE — 25010000002 ENOXAPARIN PER 10 MG: Performed by: PHYSICAL MEDICINE & REHABILITATION

## 2022-07-31 RX ADMIN — DICLOFENAC SODIUM 2 G: 10 GEL TOPICAL at 09:36

## 2022-07-31 RX ADMIN — BUDESONIDE AND FORMOTEROL FUMARATE DIHYDRATE 2 PUFF: 160; 4.5 AEROSOL RESPIRATORY (INHALATION) at 20:05

## 2022-07-31 RX ADMIN — HYDROCODONE BITARTRATE AND ACETAMINOPHEN 1 TABLET: 5; 325 TABLET ORAL at 22:22

## 2022-07-31 RX ADMIN — TAMSULOSIN HYDROCHLORIDE 0.4 MG: 0.4 CAPSULE ORAL at 09:36

## 2022-07-31 RX ADMIN — DICLOFENAC SODIUM 2 G: 10 GEL TOPICAL at 22:22

## 2022-07-31 RX ADMIN — DICLOFENAC SODIUM 2 G: 10 GEL TOPICAL at 17:36

## 2022-07-31 RX ADMIN — ENOXAPARIN SODIUM 40 MG: 100 INJECTION SUBCUTANEOUS at 22:22

## 2022-07-31 RX ADMIN — DOCUSATE SODIUM 100 MG: 100 CAPSULE, LIQUID FILLED ORAL at 22:26

## 2022-07-31 RX ADMIN — ASPIRIN 81 MG: 81 TABLET, COATED ORAL at 09:36

## 2022-07-31 RX ADMIN — COLCHICINE 0.6 MG: 0.6 TABLET, FILM COATED ORAL at 09:36

## 2022-07-31 RX ADMIN — BUDESONIDE AND FORMOTEROL FUMARATE DIHYDRATE 2 PUFF: 160; 4.5 AEROSOL RESPIRATORY (INHALATION) at 12:04

## 2022-07-31 RX ADMIN — DICLOFENAC SODIUM 2 G: 10 GEL TOPICAL at 12:13

## 2022-08-01 LAB
ANION GAP SERPL CALCULATED.3IONS-SCNC: 7.1 MMOL/L (ref 5–15)
BASOPHILS # BLD AUTO: 0.05 10*3/MM3 (ref 0–0.2)
BASOPHILS NFR BLD AUTO: 1.1 % (ref 0–1.5)
BUN SERPL-MCNC: 10 MG/DL (ref 8–23)
BUN/CREAT SERPL: 10.6 (ref 7–25)
CALCIUM SPEC-SCNC: 9.2 MG/DL (ref 8.6–10.5)
CHLORIDE SERPL-SCNC: 103 MMOL/L (ref 98–107)
CO2 SERPL-SCNC: 26.9 MMOL/L (ref 22–29)
CREAT SERPL-MCNC: 0.94 MG/DL (ref 0.76–1.27)
DEPRECATED RDW RBC AUTO: 40.9 FL (ref 37–54)
EGFRCR SERPLBLD CKD-EPI 2021: 83.5 ML/MIN/1.73
EOSINOPHIL # BLD AUTO: 0.16 10*3/MM3 (ref 0–0.4)
EOSINOPHIL NFR BLD AUTO: 3.5 % (ref 0.3–6.2)
ERYTHROCYTE [DISTWIDTH] IN BLOOD BY AUTOMATED COUNT: 13 % (ref 12.3–15.4)
GLUCOSE SERPL-MCNC: 82 MG/DL (ref 65–99)
HCT VFR BLD AUTO: 44.5 % (ref 37.5–51)
HGB BLD-MCNC: 15.3 G/DL (ref 13–17.7)
IMM GRANULOCYTES # BLD AUTO: 0.01 10*3/MM3 (ref 0–0.05)
IMM GRANULOCYTES NFR BLD AUTO: 0.2 % (ref 0–0.5)
LYMPHOCYTES # BLD AUTO: 2.19 10*3/MM3 (ref 0.7–3.1)
LYMPHOCYTES NFR BLD AUTO: 47.4 % (ref 19.6–45.3)
MCH RBC QN AUTO: 30.1 PG (ref 26.6–33)
MCHC RBC AUTO-ENTMCNC: 34.4 G/DL (ref 31.5–35.7)
MCV RBC AUTO: 87.4 FL (ref 79–97)
MONOCYTES # BLD AUTO: 0.65 10*3/MM3 (ref 0.1–0.9)
MONOCYTES NFR BLD AUTO: 14.1 % (ref 5–12)
NEUTROPHILS NFR BLD AUTO: 1.56 10*3/MM3 (ref 1.7–7)
NEUTROPHILS NFR BLD AUTO: 33.7 % (ref 42.7–76)
NRBC BLD AUTO-RTO: 0 /100 WBC (ref 0–0.2)
PLATELET # BLD AUTO: 316 10*3/MM3 (ref 140–450)
PMV BLD AUTO: 9.5 FL (ref 6–12)
POTASSIUM SERPL-SCNC: 4 MMOL/L (ref 3.5–5.2)
RBC # BLD AUTO: 5.09 10*6/MM3 (ref 4.14–5.8)
SODIUM SERPL-SCNC: 137 MMOL/L (ref 136–145)
WBC NRBC COR # BLD: 4.62 10*3/MM3 (ref 3.4–10.8)

## 2022-08-01 PROCEDURE — 85025 COMPLETE CBC W/AUTO DIFF WBC: CPT | Performed by: PHYSICAL MEDICINE & REHABILITATION

## 2022-08-01 PROCEDURE — 80048 BASIC METABOLIC PNL TOTAL CA: CPT | Performed by: PHYSICAL MEDICINE & REHABILITATION

## 2022-08-01 PROCEDURE — 97129 THER IVNTJ 1ST 15 MIN: CPT

## 2022-08-01 PROCEDURE — 94799 UNLISTED PULMONARY SVC/PX: CPT

## 2022-08-01 PROCEDURE — 94760 N-INVAS EAR/PLS OXIMETRY 1: CPT

## 2022-08-01 PROCEDURE — 97535 SELF CARE MNGMENT TRAINING: CPT

## 2022-08-01 PROCEDURE — 97110 THERAPEUTIC EXERCISES: CPT

## 2022-08-01 PROCEDURE — 94761 N-INVAS EAR/PLS OXIMETRY MLT: CPT

## 2022-08-01 PROCEDURE — 97130 THER IVNTJ EA ADDL 15 MIN: CPT

## 2022-08-01 PROCEDURE — 25010000002 ENOXAPARIN PER 10 MG: Performed by: PHYSICAL MEDICINE & REHABILITATION

## 2022-08-01 RX ORDER — COLCHICINE 0.6 MG/1
0.6 TABLET ORAL DAILY
Status: COMPLETED | OUTPATIENT
Start: 2022-08-02 | End: 2022-08-03

## 2022-08-01 RX ORDER — ATORVASTATIN CALCIUM 80 MG/1
80 TABLET, FILM COATED ORAL NIGHTLY
Status: DISCONTINUED | OUTPATIENT
Start: 2022-08-04 | End: 2022-08-05

## 2022-08-01 RX ADMIN — DOCUSATE SODIUM 100 MG: 100 CAPSULE, LIQUID FILLED ORAL at 20:13

## 2022-08-01 RX ADMIN — DICLOFENAC SODIUM 2 G: 10 GEL TOPICAL at 20:14

## 2022-08-01 RX ADMIN — ENOXAPARIN SODIUM 40 MG: 100 INJECTION SUBCUTANEOUS at 20:13

## 2022-08-01 RX ADMIN — DICLOFENAC SODIUM 2 G: 10 GEL TOPICAL at 12:06

## 2022-08-01 RX ADMIN — BUDESONIDE AND FORMOTEROL FUMARATE DIHYDRATE 2 PUFF: 160; 4.5 AEROSOL RESPIRATORY (INHALATION) at 20:35

## 2022-08-01 RX ADMIN — DOCUSATE SODIUM 100 MG: 100 CAPSULE, LIQUID FILLED ORAL at 08:30

## 2022-08-01 RX ADMIN — TAMSULOSIN HYDROCHLORIDE 0.4 MG: 0.4 CAPSULE ORAL at 08:30

## 2022-08-01 RX ADMIN — DICLOFENAC SODIUM 2 G: 10 GEL TOPICAL at 08:30

## 2022-08-01 RX ADMIN — ASPIRIN 81 MG: 81 TABLET, COATED ORAL at 08:30

## 2022-08-01 RX ADMIN — COLCHICINE 0.6 MG: 0.6 TABLET, FILM COATED ORAL at 08:30

## 2022-08-01 RX ADMIN — ERGOCALCIFEROL 50000 UNITS: 1.25 CAPSULE ORAL at 17:35

## 2022-08-01 RX ADMIN — DICLOFENAC SODIUM 2 G: 10 GEL TOPICAL at 17:36

## 2022-08-01 RX ADMIN — BUDESONIDE AND FORMOTEROL FUMARATE DIHYDRATE 2 PUFF: 160; 4.5 AEROSOL RESPIRATORY (INHALATION) at 08:10

## 2022-08-01 RX ADMIN — HYDROCODONE BITARTRATE AND ACETAMINOPHEN 1 TABLET: 5; 325 TABLET ORAL at 20:13

## 2022-08-01 NOTE — PROGRESS NOTES
Case Management  Inpatient Rehabilitation Plan of Care and Discharge Plan Note    Rehabilitation Diagnosis:  Branch  Date of Onset:  Nancy    Medical Summary:  Nancy  Past Medical History: Nancy    Plan of Care  Updated Problems/Interventions  Field    Expected Intensity:  Nancy  Interdisciplinary Team:  Nancy  Estimated Length of Stay/Anticipated Discharge Date: Branch  Anticipated Discharge Destination:  Anticipated discharge destination from inpatient rehabilitation is community  discharge with assistance. Patient lives with his wife in a quad level home with  2 steps to enter. Patient's bedroom and main bathroom are on the 2nd floor, but  patient will be moving his bedroom to a bedroom on the 1st floor so he does not  have to climb the 12 steps to the 2nd floor. D/c plan is for patient to d/c home  with his wife being able to take time off of work to provide assistance.      Based on the patient's medical and functional status, their prognosis and  expected level of functional improvement is:  Nancy    Signed by: JUDE Condon

## 2022-08-01 NOTE — PROGRESS NOTES
Hand Surgery  S  PAtient seen in OT  He, his wife, and nurse all state his L2nd MCP swelling has significantly improved since Wednesday. ROM has improved. Taking meds for gout - colchicine.     O  97.6  88  16  121/76  98%  A Ox3 sitting up in chair, participating in OT  L hand much less swollen and with good ROM  Mild tenderness to palpation    A  Arthritis - Gout vs OA    P  Cont colchicine per PCP  NSAIDS as tolerated po, else topical diclofenac  May fu with Kleinert Kutz as needed for joint pain

## 2022-08-01 NOTE — PROGRESS NOTES
[facility]     Progress Note    Patient Name: Shiela Turk Jr.  : 1945  MRN: [id number]  Primary Care Physician:  Lay Ordonez MD  Date of admission: 2022    Subjective   Subjective     Chief Complaint: Functional Impairments related to R MCA CVA w/ L Hemiparesis    History of Present Illness     Continues with weakness on the left side but is showing improvement.  Pain in the left hand is noticeably improved as his swelling.  Moving the left hand better.  Tolerating activities.  Pain in the left foot is resolved.  Still some discomfort at the left hip.  X-ray there was negative.  To use a K pad.                Objective   Objective     Vitals:   Temp:  [97.6 °F (36.4 °C)-98.3 °F (36.8 °C)] 97.6 °F (36.4 °C)  Heart Rate:  [58-68] 58  Resp:  [16-18] 16  BP: (111-117)/(66-80) 111/66    Physical Exam   Vitals reviewed.   Constitutional:       General: He is not in acute distress.     Appearance: Normal appearance.   HENT:       Cardiovascular:      Rate and Rhythm: Normal rate and regular rhythm.      Pulses: Normal pulses.      Heart sounds: No murmur heard.    No friction rub. No gallop.   Pulmonary:      Effort: Pulmonary effort is normal. No respiratory distress.      Breath sounds: Normal breath sounds. No wheezing or rales.   Abdominal:      General: Bowel sounds are normal. There is no distension.      Palpations: Abdomen is soft.      Tenderness: There is no abdominal tenderness.   Musculoskeletal:         General: No tenderness.     No significant edema in the bilateral lower extremities.       L 2nd MCP joint TTP  Minimal swelling at the left second MCP joint w/ mild pain on palp or ROM. No pain at the other MCP joints.  Appears less swollen.  Much better active range of motion for left finger flexion    Left dorsal foot with no significant tenderness.  No erythema.  No swelling.  No warmth.    Skin:     General: Skin is warm and dry.      Findings: No erythema.    Neurological:      Mental Status: He is alert and oriented to person, place, and time.   it.      Motor: Weakness present.      Comments: Appropriate in conversation; no aphasia; RUE/RLE 5/5; L shoulder abduction 4/5; L EF/EE/WE 4/5; good strength with flexion of third fourth and fifth digits.  LLE 4/5; difficulty w/ coordination in LUE  2/2 difficulty w/ sustained contraction;  Psychiatric:         Mood and Affect: Mood normal.         Behavior: Behavior normal.     Result Review    Result Review:    Results from last 7 days   Lab Units 08/01/22  0451 07/28/22  0546 07/27/22  1644   WBC 10*3/mm3 4.62 6.16 6.75   HEMOGLOBIN g/dL 15.3 15.5 15.9   HEMATOCRIT % 44.5 45.6 46.6   PLATELETS 10*3/mm3 316 293 274     Results from last 7 days   Lab Units 08/01/22  0451 07/28/22  0546   SODIUM mmol/L 137 139   POTASSIUM mmol/L 4.0 4.1   CHLORIDE mmol/L 103 103   CO2 mmol/L 26.9 25.7   BUN mg/dL 10 12   CREATININE mg/dL 0.94 0.97   CALCIUM mg/dL 9.2 9.1   GLUCOSE mg/dL 82 92          Latest Reference Range & Units 07/25/22 05:56 07/26/22 11:55 07/27/22 16:44 07/28/22 05:46   Uric Acid 3.4 - 7.0 mg/dL  6.6     TSH Baseline 0.270 - 4.200 uIU/mL    7.110 (H)   Iron 59 - 158 mcg/dL   29 (L)    Iron Saturation 20 - 50 %   11 (L)    Transferrin 200 - 360 mg/dL   185 (L)    TIBC 298 - 536 mcg/dL   276 (L)    C-Reactive Protein 0.00 - 0.50 mg/dL   10.28 (H)    Magnesium 1.6 - 2.4 mg/dL   1.9    Vitamin B-12 211 - 946 pg/mL  659     25 Hydroxy, Vitamin D 30.0 - 100.0 ng/ml 20.0 (L)         Latest Reference Range & Units 07/28/22 05:46   Free T4 0.93 - 1.70 ng/dL 1.45         Most notable findings include: L hand plain films - 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  Minimal  degenerative spurring is apparent at the left femoral head. Hip joint  spaces appear preserved. Degenerative changes are noted in the partly  included lumbar spine. Follow-up/further evaluation can be obtained as  indications persist.     LEFT HAND CT WITHOUT CONTRAST JULY 25, 2022     HISTORY: Wrist and hand pain around the 2nd metacarpophalangeal joint  with swelling after a fall one week ago.     TECHNIQUE: Axial CT of the left hand and wrist with multiplanar  reformatted images is provided and correlated with hand x-rays acquired  yesterday.     Radiation dose reduction techniques were utilized, including automated  exposure control and exposure modulation based on body size.     FINDINGS: There is arthritic change throughout the hand and the wrist  with marginal osteophytes, subcortical cysts, and irregular joint space  narrowing. There is no evidence of fracture around the 2nd  metacarpophalangeal joint or elsewhere in the hand or the wrist. No  joint effusion or other focal fluid collection is present. There appears  to be subcutaneous edema around the thenar side of the 2nd digit at the  level of the metacarpophalangeal joint. As can best be appreciated with  this modality, the collateral ligaments at that articulation appear to  be intact and the flexor and extensor tendons in the hand and the wrist  appear intact. Hand musculature is normal. There is no soft tissue cyst  or mass.     IMPRESSION:  Advanced, chronic arthritic change at the hand and the  wrist. Mild subcutaneous edema along the thenar side of the 2nd digit  around the metacarpophalangeal joint. However, there is no CT evidence  of fracture.      Assessment & Plan   Assessment / Plan     Brief Patient Summary:  Shiela Turk Jr. is a 76 y.o. male w/ functional impairments 2/2 R pontine/lacunar infacrt    Active Hospital Problems:  Active Hospital Problems    Diagnosis    • Right pontine stroke (HCC)      Plan:   R Pontine/Lacunar  CVA  - secondary stroke ppx  - monitor BP; LDL goal <70  - Vit B12 and D levels ordered to see if supplementation indicated  July 25-vitamin D deficiency-replace.     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  Swelling in the digit.  Will get CT of the hand to assess for any fracture not seen on plain x-ray.  Also obtain x-ray of the left hip-both imaging negative for fracture or any evidence of ligamentous injury given limitations of  the techniques.  July 26 - Continues with pain at the left second MCP joint with swelling that goes along the digit.  Does not describe any numbness at the distal digit.  He has pain at the MCP joint with palpation as well as with any active or passive movement.  He relates the onset of the pain to when he had the fall at home a week ago.  Reviewed plain x-ray and CT scan did not show any fracture.  As it is temporally related to a fall at home, one concern would be for ligamentous injury.  Will get hand surgery consultation to assess further.  We will presently defer any MRI imaging to hand surgery-he had recent loop recorder placed that is MRI compatible with some adjustments in the protocol such as 1.5 magnet.  Discussed the other considerations could be coincidental gout and will check a uric acid level.  He denies any past history of gout. Add: uric acid 6.6 WNL  July 27-Nursing and Occupational Therapy report increased pain and swelling at the left 2nd MCP joint.  No fever.  Has been on diclofenac gel for 2.5 days but has declined a couple of doses.  On questioning on Monday and Tuesday, the patient related the  onset to when he had a fall at home around July 19.  However, given the progressive nature of his symptoms, one consideration could be possible gout and  will do a trial of colchicine 0.6 mg now, second dose in 6 hours, then daily for 5 days after that.  Hold atorvastatin while on colchicine.  Evaluated by hand surgery-continue present regimen.  If not improved by Monday on follow-up, will consider Kenalog injection.  Labs today showed WBC 6.7 with 55 neutrophils, CRP elevated 10.78, sed rate elevated 53, iron low at 29, hemoglobin 15.9.  July 28-still with pain but looks less swollen at the second MCP joint and less tender along the digit.  In occupational therapy also notes less swelling and pain at the left hand today.  Reviewed patient continue with plan for diclofenac topically and colchicine.    July 29-left midfoot-dorsal-pain.  No trauma.  No warmth erythema.  May have some component of gout at the left midfoot.  We will continue with present colchicine.  Describes the pain with weightbearing but not at rest.  Will defer any imaging presently.  No swelling in the leg.  Homans negative.  July 30 - Pain improving. Suspect gout given hx. Monitor  August 1-left index finger MCP joint much better.  - will continue colchicine for couple more days.     HLD  BPH  Nutrition  GI ppx - senna-s 2 tab daily; 100mg colace BID; encouraged mobility/hydration     DVT prophylaxis: SCDs and Lovenox.    Thyroid-July 28-TSH elevated 7.1.  Check T4.  Subclinical hypothyroidism can be protective immediately after stroke.  July 29-Free T4 equal 1.45.  Subclinical hypothyroidism can be protective/beneficial status post recent stroke.  No changes in regimen.     CODE STATUS:    Level Of Support Discussed With: Patient  Code Status (Patient has no pulse and is not breathing): CPR (Attempt to Resuscitate)  Medical Interventions (Patient has pulse or is breathing): Full Support    Now admit for comprehensive acute inpatient  rehabilitation .  This would be an interdisciplinary program with physical therapy 1 hour,  occupational therapy 1 hour, and speech therapy 1 hour, 5 days a week.  Rehabilitation nursing for carryover, monitoring of neurologic   status, bowel and bladder, and skin  Ongoing physician follow-up.  Weekly team conferences.    Goal is for home with outpatient  therapies.  Barrier to discharge:  impaired mobility and self-care - work on   strength, coordination, balance, progressive ambulation, ADLs     to overcome.     TEAM CONF - July 26 - BED CTG. TRANSFERS MIN. GAIT 180 FEET LEFT PLATFORM RW, MIN ASSIST. WEARS B KNEE BRACES CHRONICALLY WHEN MORE ACTIVE.  TOILET TRANSFERS MIN. BATH MIN. UBD SBA. LBD MIN. GROOMING SBA. LIMITED BY LEFT 2ND MCP PAIN/ SWELLING - CT NEGATIVE FOR FRACTURE.  CLQT - MILD COGNITVE LINGUISTIC DEFICITS. DIFFICULTY WITH THOUGHT FLEXIBILITY, DELAYED RECALL AND DIVIDED ATTENTION. PASTORAL COUNSELING SEEN. PATIENT FEELS LOSS OF CONTROL - WILL FOLLOW. SKIN INTACT. DICLOFENAC CREAM TO LEFT 2ND MCP. CONTINENT BOWEL AND BLADDER.   ELOS - 1.5 TO 2 WEEKS.       Alvino Melendez MD    During rounds, used appropriate personal protective equipment including mask and gloves.  Additional gown if indicated.  Mask used was standard procedure mask. Appropriate PPE was worn during the entire visit.  Hand hygiene was completed before and after.

## 2022-08-01 NOTE — PROGRESS NOTES
Inpatient Rehabilitation Plan of Care Note    Plan of Care  Updated Problems/Interventions  Cognition    [ST] Executive Functions(Active)  Current Status(08/01/2022): Mild cognitive linguistic deficits ( attn, exec fxn,  memory); difficulty with thought flexibility, delayed recall, and divided attn  Weekly Goal(08/08/2022): Pt will participate in structured therapy activities  given min cues  Discharge Goal: fxnl cognition for home with intermittent assist    Signed by: Lydia Taylor, SLP

## 2022-08-01 NOTE — CONSULTS
"Nutrition Services    Patient Name:  Shiela Turk Jr.  YOB: 1945  MRN: [id number]  Admit Date:  7/23/2022     Comment: Follow up  Intake continues to be on poor side. Typically <25%. Poor appetite & does not like the food here. He did eat better today at lunch (75%). Will continue to follow & encourage intake.    CLINICAL NUTRITION ASSESSMENT      Reason for Assessment Acute Rehab Admission     Admitting Diagnosis    Medical/Surgical History Right Pontine Stroke    Past Medical History:   Diagnosis Date   • Arthritis    • Arthritis of back    • Arthritis of knee    • BPH (benign prostatic hyperplasia)    • Cancer (HCC)    • Cancer of kidney (HCC)    • COVID    • Rheumatic fever    • Stroke (HCC)        Past Surgical History:   Procedure Laterality Date   • CYST REMOVAL     • NEPHRECTOMY     • SOFT TISSUE BIOPSY            Nutritional Risk Screening       Risk Screening Criteria No indicators     Encounter Information        Nutrition History/Narrative:    7/25 - Pt with L side facial droop & weakness/numbness.   Seen at lunch - denies poor intake or appetite. He refused to eat his lunch d/t dislike of what was sent. Declined my offering to order him something else to eat - said he'll wait for dinner since it comes early (4:30/5).    Food Preferences:      Supplements: none     Factors Affecting Intake: dislikes hospital food, weakness       Current Nutrition Orders & Evaluation of Intake       Oral Nutrition     Food Allergies NKFA   Current PO Diet Diet Regular   Supplement n/a   PO Evaluation     Trending % PO Intake % x 3 meals       --  Anthropometrics        Current Height  Current Weight  BMI kg/m2 Height: 182.9 cm (72\")  Weight: 88.7 kg (195 lb 8.8 oz) (07/23/22 1517)  Body mass index is 26.52 kg/m².       Admission Weight 195 lb   Ideal Body Weight (IBW) 178 lb   Usual Body Weight (UBW) UTD       Weight Change    Trending Weight Hx  Wt Readings from Last 15 Encounters:   07/23/22 1517 " 88.7 kg (195 lb 8.8 oz)      --  Physical Findings        Physical Appearance  alert, hemiparesis , room air - left side weakness     Edema  2+ (mild)   Gastrointestinal constipation, last BM 7/29   Tubes/Drains none   Oral/Mouth Cavity WNL   Skin skin intact     Tests/Procedures/Labs        Tests/Procedures No new tests/procedures       Pertinent Labs     Results from last 7 days   Lab Units 08/01/22 0451 07/28/22  0546   SODIUM mmol/L 137 139   POTASSIUM mmol/L 4.0 4.1   CHLORIDE mmol/L 103 103   CO2 mmol/L 26.9 25.7   BUN mg/dL 10 12   CREATININE mg/dL 0.94 0.97   CALCIUM mg/dL 9.2 9.1   GLUCOSE mg/dL 82 92     Results from last 7 days   Lab Units 08/01/22 0451 07/28/22  0546 07/27/22  1644   MAGNESIUM mg/dL  --   --  1.9   HEMOGLOBIN g/dL 15.3   < > 15.9   HEMATOCRIT % 44.5   < > 46.6   WBC 10*3/mm3 4.62   < > 6.75    < > = values in this interval not displayed.     Results from last 7 days   Lab Units 08/01/22 0451 07/28/22  0546 07/27/22  1644   PLATELETS 10*3/mm3 316 293 274     COVID19   Date Value Ref Range Status   07/23/2022 Not Detected Not Detected - Ref. Range Final     Lab Results   Component Value Date    HGBA1C 5.2 07/20/2022        Medications           Scheduled Medications aspirin, 81 mg, Oral, Daily  [START ON 8/2/2022] atorvastatin, 80 mg, Oral, Nightly  budesonide-formoterol, 2 puff, Inhalation, BID - RT  Diclofenac Sodium, 2 g, Topical, 4x Daily  docusate sodium, 100 mg, Oral, BID  enoxaparin, 40 mg, Subcutaneous, Nightly  senna-docusate sodium, 2 tablet, Oral, Daily  tamsulosin, 0.4 mg, Oral, Daily  vitamin D, 50,000 Units, Oral, Q7 Days       Infusions     PRN Medications •  acetaminophen  •  bisacodyl  •  HYDROcodone-acetaminophen        Nutrition Diagnosis        Nutrition Dx Problem 1 Problem: Nutrition Appropriate for Condition at this Time    Etiology: Medical Diagnosis stroke    Signs/Symptoms: Report/Observation    Comment: adequate intake (when food given that pt likes)        Intervention Goal        Intervention Goal(s) Maintain nutrition status, Disease management/therapy, Maintain weight and PO intake goal %: 75     Nutrition Intervention        RD Action Advise alternative selection, Menu provided, Encourage intake, Follow Tx Progress and Care plan reviewed     Nutrition Recommendations         Diet Prescription    Supplement Prescription    EN Prescription    --  Monitor/Evaluation        Monitor Per protocol     RD to follow up per protocol.    Electronically signed by:  Chetna Barton RD  08/01/22 13:42 EDT

## 2022-08-01 NOTE — PLAN OF CARE
Goal Outcome Evaluation:  Plan of Care Reviewed With: patient        Progress: improving  Outcome Evaluation: Pt rested well this shift.  C/o pain to hand.  Norco 5 x1 this shift.  Educated pt on importance of bowel meds when using narcotic for pain.  Colace adminstered.  All meds whole w/thins.  Volteran gel applied to left hand- first knuckle and L hip.  Pt uses urinal at bedside.

## 2022-08-01 NOTE — THERAPY TREATMENT NOTE
Inpatient Rehabilitation - Occupational Therapy Treatment Note    [facility]     Patient Name: Shiela Turk Jr.  : 1945  MRN: [id number]    Today's Date: 2022                 Admit Date: 2022         ICD-10-CM ICD-9-CM   1. Impaired gait and mobility  R26.89 781.2   2. Follow-up exam  Z09 V67.9       Patient Active Problem List   Diagnosis   • Right pontine stroke (HCC)       Past Medical History:   Diagnosis Date   • Arthritis    • Arthritis of back    • Arthritis of knee    • BPH (benign prostatic hyperplasia)    • Cancer (HCC)    • Cancer of kidney (HCC)    • COVID    • Rheumatic fever    • Stroke (HCC)        Past Surgical History:   Procedure Laterality Date   • CYST REMOVAL     • NEPHRECTOMY     • SOFT TISSUE BIOPSY               IRF OT ASSESSMENT FLOWSHEET (last 12 hours)     IRF OT Evaluation and Treatment     Row Name 22 1517          OT Time and Intention    Document Type daily treatment  -CC     Mode of Treatment occupational therapy  -CC     Patient Effort good  -CC     Symptoms Noted During/After Treatment none  -CC     Row Name 22 1517          Pain Assessment    Pretreatment Pain Rating 0/10 - no pain  -CC     Posttreatment Pain Rating 0/10 - no pain  -CC     Row Name 22 1517          Cognition/Psychosocial    Affect/Mental Status (Cognition) WFL  -CC     Orientation Status (Cognition) oriented x 3  -CC     Follows Commands (Cognition) follows one-step commands  -CC     Personal Safety Interventions fall prevention program maintained;gait belt;nonskid shoes/slippers when out of bed  -CC     Row Name 22 1517          Bathing    [place] Level (Bathing) bathing skills;lower body;upper body;contact guard assist  -CC     Assistive Device (Bathing) grab bar/tub rail;hand held shower spray hose;shower chair  -CC     Position (Bathing) supported sitting;supported standing  -CC     Row Name 22 1517          Upper Body Dressing    [place] Level  (Upper Body Dressing) upper body dressing skills;doff;don;pull over garment;set up assistance  -     Position (Upper Body Dressing) supported sitting  -     Set-up Assistance (Upper Body Dressing) obtain clothing  -     Row Name 08/01/22 1517          Lower Body Dressing    [place] Level (Lower Body Dressing) doff;don;pants/bottoms;shoes/slippers;socks;underwear;minimum assist (75% patient effort)  -     Position (Lower Body Dressing) supported sitting;supported standing  -     Row Name 08/01/22 1517          Grooming    [place] Level (Grooming) grooming skills;deodorant application;oral care regimen;wash face, hands;set up  -     Position (Grooming) supported sitting  -     Set-up Assistance (Grooming) obtain supplies  -     Row Name 08/01/22 1517          Toileting    [place] Level (Toileting) toileting skills;supervision  -     Position (Toileting) supported sitting;supported standing  -     Row Name 08/01/22 1517          Bed Mobility    Comment, (Bed Mobility) in w/c  -     Row Name 08/01/22 1517          Transfers    Sit-Stand [place] (Transfers) contact guard;standby assist  -     Stand-Sit [place] (Transfers) standby assist  -     [place] Level (Toilet Transfer) supervision  -     Assistive Device (Toilet Transfer) walker, front-wheeled  -     [place] Level (Shower Transfer) contact guard  -     Assistive Device (Shower Transfer) shower chair;wheelchair  -     Row Name 08/01/22 1517          Sit-Stand Transfer    Assistive Device (Sit-Stand Transfers) wheelchair  -     Row Name 08/01/22 1517          Stand-Sit Transfer    Assistive Device (Stand-Sit Transfers) wheelchair  -     Row Name 08/01/22 1517          Toilet Transfer    Type (Toilet Transfer) sit-stand;stand-sit  -     Row Name 08/01/22 1517          Shower Transfer    Type (Shower Transfer) sit-stand;stand-sit  -     Row Name 08/01/22 1517          Motor Skills     Coordination fine motor deficit;left;upper extremity;minimal impairment  -     Results, 9 Hole Peg Test of Fine Motor Coordination in hand manipulation of chinese checkers w min/mod difficulty turning in finger tips for placement in board. Min difficlty w larger  s and stacking 10 high.  -     Row Name 08/01/22 1517          Elbow/Forearm (Therapeutic Exercise)    Elbow/Forearm (Therapeutic Exercise) strengthening exercise  -     Elbow/Forearm Strengthening (Therapeutic Exercise) left;flexion;extension;supination;pronation;sitting;2 lb free weight;10 repetitions;2 sets  -     Row Name 08/01/22 1517          Wrist (Therapeutic Exercise)    Wrist (Therapeutic Exercise) strengthening exercise  -     Wrist Strengthening (Therapeutic Exercise) flexion;extension;left;2 lb free weight;10 repetitions;3 sets  -     Row Name 08/01/22 1517          Balance    Static Sitting Balance independent  -     Static Standing Balance contact guard  -     Row Name 08/01/22 1517          Positioning and Restraints    Pre-Treatment Position sitting in chair/recliner  -     Post Treatment Position wheelchair  -     In Wheelchair sitting;exit alarm on;with PT  -           User Key  (r) = Recorded By, (t) = Taken By, (c) = Cosigned By    Initials Name Effective Dates     Phoebe Healy, OTR 06/16/21 -                  Occupational Therapy Education                 Title: PT OT SLP Therapies (In Progress)     Topic: Occupational Therapy (Done)     Point: ADL training (Done)     Description:   Instruct learner(s) on proper safety adaptation and remediation techniques during self care or transfers.   Instruct in proper use of assistive devices.              Learning Progress Summary           Patient Acceptance, E, VU by  at 7/25/2022 1243    Comment: Explanation of OT services and evaluation results.                   Point: Home exercise program (Done)     Description:   Instruct learner(s) on appropriate  technique for monitoring, assisting and/or progressing therapeutic exercises/activities.              Learning Progress Summary           Patient Acceptance, E, VU by  at 7/25/2022 1243    Comment: Explanation of OT services and evaluation results.                   Point: Precautions (Done)     Description:   Instruct learner(s) on prescribed precautions during self-care and functional transfers.              Learning Progress Summary           Patient Acceptance, E, VU by CC at 7/25/2022 1243    Comment: Explanation of OT services and evaluation results.                   Point: Body mechanics (Done)     Description:   Instruct learner(s) on proper positioning and spine alignment during self-care, functional mobility activities and/or exercises.              Learning Progress Summary           Patient Acceptance, E, VU by CC at 7/25/2022 1243    Comment: Explanation of OT services and evaluation results.                               User Key     Initials Effective Dates Name Provider Type [facility] 06/16/21 -  Phoebe Healy OTR Occupational Therapist OT                    OT Recommendation and Plan    Anticipated Discharge Disposition (OT): home with assist  Planned Therapy Interventions (OT): activity tolerance training, adaptive equipment training, BADL retraining, cognitive/visual perception retraining, IADL retraining, functional balance retraining, patient/caregiver education/training, ROM/therapeutic exercise, strengthening exercise, transfer/mobility retraining                    Time Calculation:      Time Calculation- OT     Row Name 08/01/22 1300 08/01/22 1100          Time Calculation- OT    OT Start Time 1300  - 1100  -     OT Stop Time 1330  -CC 1130  -     OT Time Calculation (min) 30 min  - 30 min  -           User Key  (r) = Recorded By, (t) = Taken By, (c) = Cosigned By    Initials Name Provider Type    CC Phoebe Healy OTR Occupational Therapist              Therapy  Charges for Today     Code Description Service Date Service Provider Modifiers Qty    16279550080 HC OT SELF CARE/MGMT/TRAIN EA 15 MIN 8/1/2022 Phoebe Healy OTR GO 2    03572344329 HC OT THER PROC EA 15 MIN 8/1/2022 Phoebe Healy OTR GO 2                   NED Harman  8/1/2022

## 2022-08-01 NOTE — THERAPY TREATMENT NOTE
"Inpatient Rehabilitation - Physical Therapy Treatment Note       [facility]     Patient Name: Shiela Turk Jr.  : 1945  MRN: [id number]    Today's Date: 2022                    Admit Date: 2022      Visit Dx:     ICD-10-CM ICD-9-CM   1. Impaired gait and mobility  R26.89 781.2   2. Follow-up exam  Z09 V67.9       Patient Active Problem List   Diagnosis   • Right pontine stroke (HCC)       Past Medical History:   Diagnosis Date   • Arthritis    • Arthritis of back    • Arthritis of knee    • BPH (benign prostatic hyperplasia)    • Cancer (HCC)    • Cancer of kidney (HCC)    • COVID    • Rheumatic fever    • Stroke (HCC)        Past Surgical History:   Procedure Laterality Date   • CYST REMOVAL     • NEPHRECTOMY     • SOFT TISSUE BIOPSY         PT ASSESSMENT (last 12 hours)     IRF PT Evaluation and Treatment     Row Name 22 09          PT Time and Intention    Document Type daily treatment  -LB     Mode of Treatment physical therapy  -LB     Patient/Family/Caregiver Comments/Observations Supine in bed. NAD  -LB     Row Name 22          General Information    Existing Precautions/Restrictions fall  -LB     Row Name 22          Pain Assessment    Pretreatment Pain Rating 4/10  -LB     Pain Location - Side/Orientation Left  -LB     Pain Location - foot  -LB     Pre/Posttreatment Pain Comment \"3\" L hand. When amb, \"7\" L hip. Premed  -LB     Row Name 22          Bed Mobility    Rolling Left [place] (Bed Mobility) modified independence  -LB     Rolling Right [place] (Bed Mobility) modified independence  -LB     Supine-Sit [place] (Bed Mobility) modified independence  -LB     Sit-Supine [place] (Bed Mobility) modified independence  -LB     Row Name 2221          Transfers    Bed-Chair [place] (Transfers) contact guard  -LB     Assistive Device (Bed-Chair Transfers) wheelchair  -LB     Sit-Stand [place] (Transfers) contact " "guard  -LB     Stand-Sit [place] (Transfers) contact guard  -LB     Row Name 08/01/22 0921          Sit-Stand Transfer    Assistive Device (Sit-Stand Transfers) walker, rolling platform;wheelchair  -LB     Row Name 08/01/22 0921          Stand-Sit Transfer    Assistive Device (Stand-Sit Transfers) walker, rolling platform;wheelchair  -LB     Row Name 08/01/22 0921          Car Transfer    Type (Car Transfer) sit-stand;stand-sit  -LB     [place] Level (Car Transfer) minimum assist (75% patient effort)  -LB     Assistive Device (Car Transfer) wheelchair  -LB     Row Name 08/01/22 0921          Gait/Stairs (Locomotion)    [place] Level (Gait) contact guard  -LB     Assistive Device (Gait) walker, rolling platform  platform on Left  -LB     Distance in Feet (Gait) 320  -LB     Pattern (Gait) step-through  -LB     Deviations/Abnormal Patterns (Gait) base of support, narrow;antalgic;stride length decreased  -LB     Bilateral Gait Deviations forward flexed posture;decreased arm swing;heel strike decreased  -LB     Left Sided Gait Deviations decreased knee extension;heel strike decreased  -LB     [place] Level (Stairs) contact guard  -LB     Handrail Location (Stairs) both sides  -LB     Number of Steps (Stairs) 12  -LB     Ascending Technique (Stairs) step-over-step  -LB     Descending Technique (Stairs) step-over-step  -LB     Comment, (Gait/Stairs) Amb w rwx w/o platform,320', w CGA. Reports that he luis miguel L hand on wx handle w/o increased pain.  -LB     Row Name 08/01/22 0921          Balance    Comment, Balance Figure 8\"s w platform rwx cga. Stepping over objects w rwx w CGA  -LB     Row Name 08/01/22 0921          Hip (Therapeutic Exercise)    Hip Isometrics (Therapeutic Exercise) bilateral;aDduction;sitting  15 reps  -LB     Hip Strengthening (Therapeutic Exercise) bilateral;flexion;aBduction;sitting;resistance band;red;15 repititions  -LB     Row Name 08/01/22 0921          Knee (Therapeutic " Exercise)    Knee Strengthening (Therapeutic Exercise) bilateral;flexion;extension;sitting;resistance band;red;15 repititions  -LB     Row Name 08/01/22 0921          Ankle (Therapeutic Exercise)    Ankle Strengthening (Therapeutic Exercise) bilateral;dorsiflexion;plantarflexion;sitting;red;15 repititions  -LB     Row Name 08/01/22 0921          Positioning and Restraints    Post Treatment Position wheelchair  -LB     In Wheelchair sitting;call light within reach;exit alarm on;with family/caregiver  -LB           User Key  (r) = Recorded By, (t) = Taken By, (c) = Cosigned By    Initials Name Provider Type    LB Keiry Roth, PTA Physical Therapist Assistant                 Physical Therapy Education                 Title: PT OT SLP Therapies (In Progress)     Topic: Physical Therapy (In Progress)     Point: Mobility training (In Progress)     Learning Progress Summary           Patient Acceptance, E, NR by  at 8/1/2022 1337    Comment: car tsf, stairs    Acceptance, E, NR by LB at 7/30/2022 1213    Comment: stairs    Acceptance, E, NR by LB at 7/29/2022 1453    Acceptance, E,D, NR by  at 7/28/2022 1513    Acceptance, E, NR by LB at 7/27/2022 0959    Comment: car tsf    Acceptance, E, NR by LB at 7/26/2022 0957    Comment: stairs, curb    Acceptance, E, NR by LB at 7/25/2022 0955    Comment: Amb    Acceptance, E,TB, VU,NR by  at 7/24/2022 1148    Comment: not to get up on his own, call for assist                   Point: Home exercise program (In Progress)     Learning Progress Summary           Patient Acceptance, E,D, NR by  at 7/28/2022 1513                   Point: Body mechanics (Not Started)     Learner Progress:  Not documented in this visit.          Point: Precautions (Not Started)     Learner Progress:  Not documented in this visit.                      User Key     Initials Effective Dates Name Provider Type Discipline    JORGE LUIS 06/16/21 -  Kanwal Coleman, PT Physical Therapist PT    [facility]  03/07/18 -  Keiry Roth PTA Physical Therapist Assistant PT     06/16/21 -  Destiny Steiner PT Physical Therapist PT                PT Recommendation and Plan      Patient was wearing a face mask during this therapy encounter. Therapist used appropriate personal protective equipment including mask and gloves.  Mask used was standard procedure mask. Appropriate PPE was worn during the entire therapy session. Hand hygiene was completed before and after therapy session. Patient is not in enhanced droplet precautions.                        Time Calculation:      PT Charges     Row Name 08/01/22 1336 08/01/22 1300 08/01/22 0943       Time Calculation    Start Time 1330  -LB 1230  -LB 0900  -LB    Stop Time 1400  -LB 1300  -LB 0930  -LB    Time Calculation (min) 30 min  -LB 30 min  -LB 30 min  -LB          User Key  (r) = Recorded By, (t) = Taken By, (c) = Cosigned By    Initials Name Provider Type    LB Keiry Roth PTA Physical Therapist Assistant                Therapy Charges for Today     Code Description Service Date Service Provider Modifiers Qty    94756606917 HC PT THER PROC EA 15 MIN 8/1/2022 Keiry Roth PTA GP 6                   Keiry Roth PTA  8/1/2022

## 2022-08-01 NOTE — PROGRESS NOTES
Inpatient Rehabilitation Plan of Care Note    Plan of Care  Care Plan Reviewed - No updates at this time.    Safety    Performed Intervention(s)  Falls protocal  Btrm schdule every 4 hours  items within reach      Sphincter Control    Performed Intervention(s)  [place] I and O  Stool softener as needed  Bowel program of choice  Btrm schedule      Psychosocial    Performed Intervention(s)  Support/peer groups  Verbalizes needs and concerns    Signed by: Jaleesa Ambrose RN

## 2022-08-02 PROCEDURE — 94799 UNLISTED PULMONARY SVC/PX: CPT

## 2022-08-02 PROCEDURE — 94760 N-INVAS EAR/PLS OXIMETRY 1: CPT

## 2022-08-02 PROCEDURE — 97110 THERAPEUTIC EXERCISES: CPT

## 2022-08-02 PROCEDURE — 97112 NEUROMUSCULAR REEDUCATION: CPT

## 2022-08-02 PROCEDURE — 25010000002 ENOXAPARIN PER 10 MG: Performed by: PHYSICAL MEDICINE & REHABILITATION

## 2022-08-02 PROCEDURE — 97535 SELF CARE MNGMENT TRAINING: CPT

## 2022-08-02 PROCEDURE — 94761 N-INVAS EAR/PLS OXIMETRY MLT: CPT

## 2022-08-02 PROCEDURE — 92507 TX SP LANG VOICE COMM INDIV: CPT

## 2022-08-02 PROCEDURE — 94664 DEMO&/EVAL PT USE INHALER: CPT

## 2022-08-02 RX ADMIN — ASPIRIN 81 MG: 81 TABLET, COATED ORAL at 07:52

## 2022-08-02 RX ADMIN — DICLOFENAC SODIUM 2 G: 10 GEL TOPICAL at 20:30

## 2022-08-02 RX ADMIN — COLCHICINE 0.6 MG: 0.6 TABLET, FILM COATED ORAL at 07:53

## 2022-08-02 RX ADMIN — HYDROCODONE BITARTRATE AND ACETAMINOPHEN 1 TABLET: 5; 325 TABLET ORAL at 07:54

## 2022-08-02 RX ADMIN — DICLOFENAC SODIUM 2 G: 10 GEL TOPICAL at 17:08

## 2022-08-02 RX ADMIN — DICLOFENAC SODIUM 2 G: 10 GEL TOPICAL at 12:05

## 2022-08-02 RX ADMIN — DOCUSATE SODIUM 100 MG: 100 CAPSULE, LIQUID FILLED ORAL at 20:30

## 2022-08-02 RX ADMIN — DICLOFENAC SODIUM 2 G: 10 GEL TOPICAL at 08:14

## 2022-08-02 RX ADMIN — BUDESONIDE AND FORMOTEROL FUMARATE DIHYDRATE 2 PUFF: 160; 4.5 AEROSOL RESPIRATORY (INHALATION) at 20:01

## 2022-08-02 RX ADMIN — ENOXAPARIN SODIUM 40 MG: 100 INJECTION SUBCUTANEOUS at 20:31

## 2022-08-02 RX ADMIN — BUDESONIDE AND FORMOTEROL FUMARATE DIHYDRATE 2 PUFF: 160; 4.5 AEROSOL RESPIRATORY (INHALATION) at 07:28

## 2022-08-02 RX ADMIN — DOCUSATE SODIUM 100 MG: 100 CAPSULE, LIQUID FILLED ORAL at 07:56

## 2022-08-02 RX ADMIN — TAMSULOSIN HYDROCHLORIDE 0.4 MG: 0.4 CAPSULE ORAL at 07:52

## 2022-08-02 RX ADMIN — HYDROCODONE BITARTRATE AND ACETAMINOPHEN 1 TABLET: 5; 325 TABLET ORAL at 18:54

## 2022-08-02 NOTE — PLAN OF CARE
Goal Outcome Evaluation:  Plan of Care Reviewed With: patient        Progress: improving  Outcome Evaluation: Pt rested well this shift.  [place] at HS for pain to hand, volteran gel to hand.  Meds whole w/thins.  K pad at bedside.  Urinal at bedside.

## 2022-08-02 NOTE — PROGRESS NOTES
TEAM CONF - AUGUST 2 -  BED MODIFIED INDEPENDENT ASSIST. TRANSFERS CTG. GAIT 300 RW CTG. TOILET TRANSFERS CTG SBA. SHOWER TRANSFERS CTG SBA. BATH SBA. LBD CTG. UBD SET UP. GROOMING SET UP TOILETING SBA. IMPAIRED MEMORY WITH TASKS. LEFT 2ND MCP IMPROVED, DECREASED SWELLING AND PAIN, BETTER ROM, SEEN IN FOLLOW UP BY HAND SURGERY AND NO INJECTION, CONTINUE COURSE OF COLCHICINE/ DICLOFENC CREAM. ICE TO LEFT HAND/ ICE/HEAT TO LEFT HIP. COGNITION - PROBLEM SOLVING, ATTENTION TO DETAIL, MEMORY, POOR AWARENESS OF ERRORS.   [facility] - END OF WEEK. OUTPATIENT OT,PT, SLP

## 2022-08-02 NOTE — THERAPY PROGRESS REPORT/RE-CERT
Inpatient Rehabilitation - Occupational Therapy Progress Note    [facility]     Patient Name: Shiela Turk Jr.  : 1945  MRN: [id number]    Today's Date: 2022                 Admit Date: 2022         ICD-10-CM ICD-9-CM   1. Impaired gait and mobility  R26.89 781.2   2. Follow-up exam  Z09 V67.9   3. Right pontine stroke (HCC)  I63.50 434.91       Patient Active Problem List   Diagnosis   • Right pontine stroke (HCC)       Past Medical History:   Diagnosis Date   • Arthritis    • Arthritis of back    • Arthritis of knee    • BPH (benign prostatic hyperplasia)    • Cancer (HCC)    • Cancer of kidney (HCC)    • COVID    • Rheumatic fever    • Stroke (HCC)        Past Surgical History:   Procedure Laterality Date   • CYST REMOVAL     • NEPHRECTOMY     • SOFT TISSUE BIOPSY               IRF OT ASSESSMENT FLOWSHEET (last 12 hours)     IRF OT Evaluation and Treatment     Row Name 22 1424          OT Time and Intention    Document Type progress note;daily treatment  -CC     Mode of Treatment occupational therapy  -CC     Patient Effort good  -CC     Symptoms Noted During/After Treatment none  -CC     Row Name 22 1424          Pain Assessment    Pretreatment Pain Rating 4/10  -CC     Posttreatment Pain Rating 4/10  -CC     Pain Location - Side/Orientation Left  -     Pain Location - hip  -CC     Row Name 22 1424          Cognition/Psychosocial    Affect/Mental Status (Cognition) WFL  -CC     Orientation Status (Cognition) oriented x 3  -CC     Follows Commands (Cognition) follows one-step commands  -CC     Personal Safety Interventions fall prevention program maintained;gait belt;nonskid shoes/slippers when out of bed  -CC     Row Name 22 1424          Bathing    [place] Level (Bathing) bathing skills;lower body;upper body;supervision  -CC     Assistive Device (Bathing) grab bar/tub rail;hand held shower spray hose;shower chair  -CC     Position (Bathing) supported  sitting;supported standing  -     Set-up Assistance (Bathing) obtain supplies;adjust water temperature  -     Row Name 08/02/22 1424          Upper Body Dressing    [place] Level (Upper Body Dressing) upper body dressing skills;doff;don;pull over garment;supervision  -     Position (Upper Body Dressing) supported sitting  -     Set-up Assistance (Upper Body Dressing) obtain clothing  -[facility] Name 08/02/22 1424          Lower Body Dressing    [place] Level (Lower Body Dressing) doff;don;pants/bottoms;shoes/slippers;socks;underwear;supervision;standby assist  -     Position (Lower Body Dressing) supported sitting;supported standing  -     Set-up Assistance (Lower Body Dressing) obtain clothing  -[facility] Name 08/02/22 1424          Grooming    [place] Level (Grooming) grooming skills;deodorant application;oral care regimen;wash face, hands;set up  -     Position (Grooming) supported sitting  -     Set-up Assistance (Grooming) obtain supplies  -[facility] Name 08/02/22 1424          Bed Mobility    Supine-Sit [place] (Bed Mobility) modified independence  -[facility] Name 08/02/22 1424          Functional Mobility    Functional Mobility- Ind. Level contact guard assist  [facility]     Functional Mobility- Device walker, front-wheeled  -     Functional Mobility-Distance (Feet) --  in room  -[facility] Name 08/02/22 1424          Transfers    Sit-Stand [place] (Transfers) contact guard;standby assist  -     Stand-Sit [place] (Transfers) standby assist  [facility]     [place] Level (Shower Transfer) contact guard;stand by assist  -     Assistive Device (Shower Transfer) shower chair;wheelchair;walker, front-wheeled  -[facility] Name 08/02/22 1424          Sit-Stand Transfer    Assistive Device (Sit-Stand Transfers) walker, front-wheeled;wheelchair  -[facility] Name 08/02/22 1424          Stand-Sit Transfer    Assistive Device (Stand-Sit Transfers) walker,  front-wheeled;wheelchair  -CC     Row Name 08/02/22 1424          Shower Transfer    Type (Shower Transfer) sit-stand;stand-sit  -     Row Name 08/02/22 1424          Motor Skills    Coordination fine motor deficit;left;upper extremity  -CC     Results, 9 Hole Peg Test of Fine Motor Coordination manipulation of small square pegs with min difficulty turning in finger tip. Proper placement on peg baord w decreased vc for UE compensation.  -CC     Functional Endurance good  -     Row Name 08/02/22 1424          Balance    Static Sitting Balance independent  -     Static Standing Balance standby assist  -     Row Name 08/02/22 1424          Positioning and Restraints    Pre-Treatment Position in bed  -CC     Post Treatment Position wheelchair  -     In Wheelchair sitting;with SLP  -     Row Name 08/02/22 1424          Transfer Goal 1 (OT-IRF)    Progress/Outcomes (Transfer Goal 1, OT-IRF) goal met  -     Row Name 08/02/22 1424          Transfer Goal 2 (OT-IRF)    Progress/Outcomes (Transfer Goal 2, OT-IRF) goal partially met  -     Row Name 08/02/22 1424          UB Dressing Goal 1 (OT-IRF)    Progress/Outcomes (UB Dressing Goal 1, OT-IRF) goal met  -     Row Name 08/02/22 1424          Grooming Goal 1 (OT-IRF)    Progress/Outcomes (Grooming Goal 1, OT-IRF) goal met  -     Row Name 08/02/22 1424          Toileting Goal 1 (OT-IRF)    Progress/Outcomes (Toileting Goal 1, OT-IRF) goal met  -     Row Name 08/02/22 1424          Toileting Goal 2 (OT-IRF)    Progress/Outcomes (Toileting Goal 2, OT-IRF) continuing progress toward goal  -     Row Name 08/02/22 1424          ROM Goal 1 (OT-IRF)    Progress/Outcomes (ROM Goal 1, OT-IRF) continuing progress toward goal  -     Row Name 08/02/22 1424          Strength Goal 1 (OT-IRF)    Progress/Outcomes (Strength Goal 1, OT-IRF) continuing progress toward goal  -     Row Name 08/02/22 1424          Balance Goal 1 (OT)    Progress/Outcomes (Balance Goal  1, OT) goal met  -CC     Row Name 08/02/22 1424          Balance Goal 2 (OT)    Progress/Outcome (Balance Goal 2, OT) continuing progress toward goal  -CC     Row Name 08/02/22 1424          Coordination Goal 1 (OT)    Progress/Outcomes (Coordination Goal 1, OT) continuing progress toward goal  -CC     Row Name 08/02/22 1424          Coordination Goal 2 (OT)    Progress/Outcomes (Coordination Goal 2, OT) continuing progress toward goal  -CC     Row Name 08/02/22 1424          Caregiver Training Goal 1 (OT-IRF)    Progress/Outcomes (Caregiver Training Goal 1, OT-IRF) continuing progress toward goal  -CC           User Key  (r) = Recorded By, (t) = Taken By, (c) = Cosigned By    Initials Name Effective Dates    CC Phoebe Healy OTR 06/16/21 -                  Occupational Therapy Education                 Title: PT OT SLP Therapies (In Progress)     Topic: Occupational Therapy (Done)     Point: ADL training (Done)     Description:   Instruct learner(s) on proper safety adaptation and remediation techniques during self care or transfers.   Instruct in proper use of assistive devices.              Learning Progress Summary           Patient Acceptance, E, VU by  at 7/25/2022 1243    Comment: Explanation of OT services and evaluation results.                   Point: Home exercise program (Done)     Description:   Instruct learner(s) on appropriate technique for monitoring, assisting and/or progressing therapeutic exercises/activities.              Learning Progress Summary           Patient Acceptance, E, VU by  at 7/25/2022 1243    Comment: Explanation of OT services and evaluation results.                   Point: Precautions (Done)     Description:   Instruct learner(s) on prescribed precautions during self-care and functional transfers.              Learning Progress Summary           Patient Acceptance, E, VU by  at 7/25/2022 1243    Comment: Explanation of OT services and evaluation results.                    Point: Body mechanics (Done)     Description:   Instruct learner(s) on proper positioning and spine alignment during self-care, functional mobility activities and/or exercises.              Learning Progress Summary           Patient Acceptance, E, VU by  at 7/25/2022 1243    Comment: Explanation of OT services and evaluation results.                               User Key     Initials Effective Dates Name Provider Type Discipline     06/16/21 -  Phoebe Healy OTR Occupational Therapist OT                    OT Recommendation and Plan    Anticipated Discharge Disposition (OT): home with assist  Planned Therapy Interventions (OT): activity tolerance training, adaptive equipment training, BADL retraining, cognitive/visual perception retraining, IADL retraining, functional balance retraining, patient/caregiver education/training, ROM/therapeutic exercise, strengthening exercise, transfer/mobility retraining                    Time Calculation:      Time Calculation- OT     Row Name 08/02/22 1300 08/02/22 1100          Time Calculation- OT    OT Start Time 1300  -CC 1100  -CC     OT Stop Time 1330  -CC 1130  -CC     OT Time Calculation (min) 30 min  -CC 30 min  -CC           User Key  (r) = Recorded By, (t) = Taken By, (c) = Cosigned By    Initials Name Provider Type    CC Phoebe Healy OTR Occupational Therapist              Therapy Charges for Today     Code Description Service Date Service Provider Modifiers Qty    53943117551 HC OT SELF CARE/MGMT/TRAIN EA 15 MIN 8/1/2022 Phoebe Healy OTR GO 2    14572669846 HC OT THER PROC EA 15 MIN 8/1/2022 Phoebe Healy OTR GO 2    80697837322 HC OT SELF CARE/MGMT/TRAIN EA 15 MIN 8/2/2022 Phoebe Healy OTR GO 2    60291569967 HC OT NEUROMUSC RE EDUCATION EA 15 MIN 8/2/2022 Phoebe Healy OTR GO 2                   NED Harman  8/2/2022

## 2022-08-02 NOTE — PLAN OF CARE
Goal Outcome Evaluation:           Progress: improving  Outcome Evaluation: Stroke. L. robel. L. facial droop. HR runs faustino. Continent B&B. Last BM 7/29. Given bowel meds. Hx. falls. Fell here. Pain to L. hand. Edema to pointer finger on L. hand. pain has improved. Swelling has lessened. Applied voltaren gel. Given Norco and ice with some relief. Labs: M/Th: CBC and BMP. Prefers the bedroom door to remain closed at all times and keep the lights off. Meds whole with water. Participated in therapy and rested in the bed. Diet: Reg, thins. Did not eat much at meals. Assistx1 with wheelchair. Cscan of L. hip. D/C home Friday, 8/5.

## 2022-08-02 NOTE — THERAPY TREATMENT NOTE
"Inpatient Rehabilitation - Speech Language Pathology Treatment Note    [facility]     Patient Name: Shiela Turk Jr.  : 1945  MRN: [id number]    Today's Date: 2022                   Admit Date: 2022       Visit Dx:      ICD-10-CM ICD-9-CM   1. Impaired gait and mobility  R26.89 781.2   2. Follow-up exam  Z09 V67.9   3. Right pontine stroke (HCC)  I63.50 434.91       Patient Active Problem List   Diagnosis   • Right pontine stroke (HCC)       Past Medical History:   Diagnosis Date   • Arthritis    • Arthritis of back    • Arthritis of knee    • BPH (benign prostatic hyperplasia)    • Cancer (HCC)    • Cancer of kidney (HCC)    • COVID    • Rheumatic fever    • Stroke (HCC)        Past Surgical History:   Procedure Laterality Date   • CYST REMOVAL     • NEPHRECTOMY     • SOFT TISSUE BIOPSY         SLP Recommendation and Plan                                                   SLP EVALUATION (last 72 hours)     SLP SLC Evaluation     Row Name 22 1330 22 0930 22 1400 22 1000          Communication Assessment/Intervention    Document Type therapy note (daily note)  -CB therapy note (daily note)  -CB therapy note (daily note)  -SR therapy note (daily note)  -SR     Subjective Information no complaints  -CB -- no complaints  -SR no complaints  -SR     Patient Observations alert;cooperative;agree to therapy  -CB alert;cooperative;agree to therapy  -CB alert;cooperative;agree to therapy  -SR alert;cooperative;agree to therapy  -SR     Patient Effort good  -CB adequate  -CB good  -SR good  -SR     Comment -- Pt reports \"I come in here and feel stupid\"  -CB -- --     Symptoms Noted During/After Treatment -- -- none  -SR none  -SR            General Information    Patient Profile Reviewed yes  -CB yes  -CB -- --            Pain Scale: Numbers Pre/Post-Treatment    Pretreatment Pain Rating -- -- 0/10 - no pain  -SR 5/10  -SR     Posttreatment Pain Rating -- -- 0/10 - no pain  -SR 5/10  " -SR     Pain Location - -- -- -- hip  -SR           User Key  (r) = Recorded By, (t) = Taken By, (c) = Cosigned By    Initials Name Effective Dates    CB Tarsha Clinton, SLP 11/16/21 -     SR Lydia Taylor, SLP 01/12/22 -                    EDUCATION    The patient has been educated in the following areas:       Cognitive Impairment.             SLP GOALS     Row Name 08/02/22 1330 08/02/22 0930 08/01/22 1400       Attention Goal 1 (SLP)    Improve Attention by Goal 1 (SLP) -- -- complete divided attention task;complete selective attention task;90%;independently (over 90% accuracy)  -SR    Time Frame (Attention Goal 1, SLP) -- -- by discharge  -SR    Progress/Outcomes (Attention Goal 1, SLP) -- -- goal ongoing  -SR    Comment (Attention Goal 1, SLP) -- -- Four component written directions; 80% acc given min cues.  -SR       Memory Skills Goal 1 (SLP)    Improve Memory Skills Through Goal 1 (SLP) -- -- recall details of the day;use memory strategies;use written schedule;listen to a paragraph and answer questions;read a paragraph and answer questions;visual memory task;select a word from a list by exclusion;repeat list in sequential order;recall details from a word list;recalling unrelated word lists with an imposed delay;recalling related word lists with an imposed delay;90%  -SR    Time Frame (Memory Skills Goal 1, SLP) -- -- by discharge  -SR    Progress (Memory Skills Goal 1, SLP) 80%;with minimal cues (75-90%)  -CB -- --    Progress/Outcomes (Memory Skills Goal 1, SLP) goal ongoing  -CB -- goal ongoing  -SR    Comment (Memory Skills Goal 1, SLP) associative word lists  -CB -- Immediate recall of 5 words; 30% acc independently; 90% acc given min cues  -SR       Reasoning Goal 1 (SLP)    Improve Reasoning Through Goal 1 (SLP) -- -- complete basic reasoning task;complete high level reasoning task;complete deductive reasoning task;complete mental flexibility task;complete logic/creative thinking  task;90%;independently (over 90% accuracy)  -SR    Time Frame (Reasoning Goal 1, SLP) -- -- by discharge  -SR    Progress/Outcomes (Reasoning Goal 1, SLP) -- -- goal ongoing  -SR    Comment (Reasoning Goal 1, SLP) -- -- Identified incongruities on restaurant menu with 70% acc independently; 90% acc given min cues. Pt benefitted from verbal cues for attention to detail.  -SR       Additional Goal 1 (SLP)    Additional Goal 1, SLP -- Pt will decrease vocal fatigue w/use of vocal exercises  -CB --    Time Frame (Additional Goal 1, SLP) -- by discharge  -CB --    Progress/Outcomes (Additional Goal 1, SLP) -- good progress toward goal  -CB --    Row Name 08/01/22 1000             Functional Problem Solving Skills Goal 1 (SLP)    Improve Problem Solving Through Goal 1 (SLP) complete organization/home management task;sequence steps in a task;determine solutions to multifactorial problems;90%;independently (over 90% accuracy)  -SR      Time Frame (Problem Solving Goal 1, SLP) by discharge  -SR      Progress/Outcomes (Problem Solving Goal 1, SLP) goal ongoing  -SR      Comment (Problem Solving Goal 1, SLP) Labeled steps in sequential order with 75% acc independently  -SR              Executive Functional Skills Goal 1 (SLP)    Improve Executive Function Skills Goal 1 (SLP) perform self-evaluation;perform self-correction;exhibit cognitive flexibility;home management activity;realistic goal setting;complex organization/planning activity;time management activity;organization/planning activity;90%;independently (over 90% accuracy)  -SR      Time Frame (Executive Function Skills Goal 1, SLP) by discharge  -SR      Progress/Outcomes (Executive Function Skills Goal 1, SLP) goal ongoing  -SR      Comment (Executive Function Skills Goal 1, SLP) Medication management; Pt sorted 10 mock medications using pill organizer (AM and PM sections) given verbal instructions with 60% acc independently; 90% acc given min cues.  Cues were provided  for placement. Pt often added extra medication to a section; showed increased awareness given verbal cues.  -SR            User Key  (r) = Recorded By, (t) = Taken By, (c) = Cosigned By    Initials Name Provider Type    Tarsha Elmore SLP Speech and Language Pathologist    Lydia Arreguin SLP Speech and Language Pathologist                            Time Calculation:        Time Calculation- SLP     Row Name 08/02/22 1359 08/02/22 1010          Time Calculation- SLP    SLP Start Time 1330  -CB 0930  -CB     SLP Stop Time 1400  -CB 1000  -CB     SLP Time Calculation (min) 30 min  -CB 30 min  -CB     SLP Received On 08/02/22  -CB 08/02/22  -CB            Untimed Charges    [id number]-TI Treatment/ST Modification Prosth Aug Alter  30  -CB 30  -CB            Total Minutes    Untimed Charges Total Minutes 30  -CB 30  -CB      Total Minutes 30  -CB 30  -CB           User Key  (r) = Recorded By, (t) = Taken By, (c) = Cosigned By    Initials Name Provider Type    Tarsha Elmore SLP Speech and Language Pathologist                  Therapy Charges for Today     Code Description Service Date Service Provider Modifiers Qty    09500621898 HC ST TREATMENT SPEECH 2 8/2/2022 Tarsha Clinton SLP GN 1    74005723153 HC ST TREATMENT SPEECH 2 8/2/2022 Tarsha Clinton SLP GN 1                           HEIDI DAY  8/2/2022

## 2022-08-02 NOTE — PROGRESS NOTES
[facility]     Progress Note    Patient Name: Shiela Turk Jr.  : 1945  MRN: [id number]  Primary Care Physician:  Lay Ordonez MD  Date of admission: 2022    Subjective   Subjective     Chief Complaint: Functional Impairments related to R MCA CVA w/ L Hemiparesis    History of Present Illness       Continues with weakness on the left side but improving.  Pain at the left second MCP joint improved as is the swelling better movement in the left hand.  When standing there is visible contour change at the left hip/buttock compared to the right.  Still with soreness.  He is ambulating 300feet.              Objective   Objective     Vitals:   Temp:  [97.3 °F (36.3 °C)-97.7 °F (36.5 °C)] 97.3 °F (36.3 °C)  Heart Rate:  [63-67] 67  Resp:  [18] 18  BP: (109-114)/(61-71) 114/61    Physical Exam   Vitals reviewed.   Constitutional:       General: He is not in acute distress.     Appearance: Normal appearance.   HENT:       Cardiovascular:      Rate and Rhythm: Normal rate and regular rhythm.      Pulses: Normal pulses.      Heart sounds: No murmur heard.    No friction rub. No gallop.   Pulmonary:      Effort: Pulmonary effort is normal. No respiratory distress.      Breath sounds: Normal breath sounds. No wheezing or rales.   Abdominal:      General: Bowel sounds are normal. There is no distension.      Palpations: Abdomen is soft.      Tenderness: There is no abdominal tenderness.   Musculoskeletal:         General: No tenderness.     No significant edema in the bilateral lower extremities.       L 2nd MCP joint much improved with significantly decreased swelling and tenderness  On standing there is altered contour of the left buttock and lateral hip compared to the right.  Area appears firm and not soft on palpation    Skin:     General: Skin is warm and dry.      Findings: No erythema.   Neurological:      Mental Status: He is alert and oriented to person, place, and time.   it.       Motor: Weakness present.      Comments: Appropriate in conversation; no aphasia; RUE/RLE 5/5; L shoulder abduction 4/5; L EF/EE/WE 4/5; good finger flexion range of motion and takes resistance better with less pain.  LLE 4/5      Psychiatric:         Mood and Affect: Mood normal.         Behavior: Behavior normal.     Result Review    Result Review:    Results from last 7 days   Lab Units 08/01/22  0451 07/28/22  0546 07/27/22  1644   WBC 10*3/mm3 4.62 6.16 6.75   HEMOGLOBIN g/dL 15.3 15.5 15.9   HEMATOCRIT % 44.5 45.6 46.6   PLATELETS 10*3/mm3 316 293 274     Results from last 7 days   Lab Units 08/01/22 0451 07/28/22  0546   SODIUM mmol/L 137 139   POTASSIUM mmol/L 4.0 4.1   CHLORIDE mmol/L 103 103   CO2 mmol/L 26.9 25.7   BUN mg/dL 10 12   CREATININE mg/dL 0.94 0.97   CALCIUM mg/dL 9.2 9.1   GLUCOSE mg/dL 82 92          Latest Reference Range & Units 07/25/22 05:56 07/26/22 11:55 07/27/22 16:44 07/28/22 05:46   Uric Acid 3.4 - 7.0 mg/dL  6.6     TSH Baseline 0.270 - 4.200 uIU/mL    7.110 (H)   Iron 59 - 158 mcg/dL   29 (L)    Iron Saturation 20 - 50 %   11 (L)    Transferrin 200 - 360 mg/dL   185 (L)    TIBC 298 - 536 mcg/dL   276 (L)    C-Reactive Protein 0.00 - 0.50 mg/dL   10.28 (H)    Magnesium 1.6 - 2.4 mg/dL   1.9    Vitamin B-12 211 - 946 pg/mL  659     25 Hydroxy, Vitamin D 30.0 - 100.0 ng/ml 20.0 (L)         Latest Reference Range & Units 07/28/22 05:46   Free T4 0.93 - 1.70 ng/dL 1.45         Most notable findings include: L hand plain films - 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 Minimal  degenerative spurring is apparent at the left femoral head. Hip joint  spaces appear preserved. Degenerative changes are noted in the  partly  included lumbar spine. Follow-up/further evaluation can be obtained as  indications persist.     LEFT HAND CT WITHOUT CONTRAST JULY 25, 2022     HISTORY: Wrist and hand pain around the 2nd metacarpophalangeal joint  with swelling after a fall one week ago.     TECHNIQUE: Axial CT of the left hand and wrist with multiplanar  reformatted images is provided and correlated with hand x-rays acquired  yesterday.     Radiation dose reduction techniques were utilized, including automated  exposure control and exposure modulation based on body size.     FINDINGS: There is arthritic change throughout the hand and the wrist  with marginal osteophytes, subcortical cysts, and irregular joint space  narrowing. There is no evidence of fracture around the 2nd  metacarpophalangeal joint or elsewhere in the hand or the wrist. No  joint effusion or other focal fluid collection is present. There appears  to be subcutaneous edema around the thenar side of the 2nd digit at the  level of the metacarpophalangeal joint. As can best be appreciated with  this modality, the collateral ligaments at that articulation appear to  be intact and the flexor and extensor tendons in the hand and the wrist  appear intact. Hand musculature is normal. There is no soft tissue cyst  or mass.     IMPRESSION:  Advanced, chronic arthritic change at the hand and the  wrist. Mild subcutaneous edema along the thenar side of the 2nd digit  around the metacarpophalangeal joint. However, there is no CT evidence  of fracture.      Assessment & Plan   Assessment / Plan     Brief Patient Summary:  Shiela Turk JrGeovanna is a 76 y.o. male w/ functional impairments 2/2 R pontine/lacunar infacrt    Active Hospital Problems:  Active Hospital Problems    Diagnosis    • Right pontine stroke (HCC)      Plan:   R Pontine/Lacunar CVA  - secondary stroke ppx  - monitor BP; LDL goal <70  - Vit B12 and D levels ordered to see if supplementation indicated  July 25-vitamin D  deficiency-replace.     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  Swelling in the digit.  Will get CT of the hand to assess for any fracture not seen on plain x-ray.  Also obtain x-ray of the left hip-both imaging negative for fracture or any evidence of ligamentous injury given limitations of  the techniques.  July 26 - Continues with pain at the left second MCP joint with swelling that goes along the digit.  Does not describe any numbness at the distal digit.  He has pain at the MCP joint with palpation as well as with any active or passive movement.  He relates the onset of the pain to when he had the fall at home a week ago.  Reviewed plain x-ray and CT scan did not show any fracture.  As it is temporally related to a fall at home, one concern would be for ligamentous injury.  Will get hand surgery consultation to assess further.  We will presently defer any MRI imaging to hand surgery-he had recent loop recorder placed that is MRI compatible with some adjustments in the protocol such as 1.5 magnet.  Discussed the other considerations could be coincidental gout and will check a uric acid level.  He denies any past history of gout. Add: uric acid 6.6 WNL  July 27-Nursing and Occupational Therapy report increased pain and swelling at the left 2nd MCP joint.  No fever.  Has been on diclofenac gel for 2.5 days but has declined a couple of doses.  On questioning on Monday and Tuesday, the patient related the onset to when he had a fall at home around July 19.  However, given the progressive nature of his symptoms, one consideration could be possible  gout and  will do a trial of colchicine 0.6 mg now, second dose in 6 hours, then daily for 5 days after that.  Hold atorvastatin while on colchicine.  Evaluated by hand surgery-continue present regimen.  If not improved by Monday on follow-up, will consider Kenalog injection.  Labs today showed WBC 6.7 with 55 neutrophils, CRP elevated 10.78, sed rate elevated 53, iron low at 29, hemoglobin 15.9.  July 28-still with pain but looks less swollen at the second MCP joint and less tender along the digit.  In occupational therapy also notes less swelling and pain at the left hand today.  Reviewed patient continue with plan for diclofenac topically and colchicine.    July 29-left midfoot-dorsal-pain.  No trauma.  No warmth erythema.  May have some component of gout at the left midfoot.  We will continue with present colchicine.  Describes the pain with weightbearing but not at rest.  Will defer any imaging presently.  No swelling in the leg.  Homans negative.  July 30 - Pain improving. Suspect gout given hx. Monitor  August 1-left index finger MCP joint much better.  - will continue colchicine for couple more days.  August 2-visible contour difference at the left buttock and lateral hip compared to the right and area appears firm and not soft.  Appears firmer than the hematoma.  - will check CT scan.  X-ray previously was negative for fracture.  Still with pain at the lateral hip but ambulating 300 feet.  Finger pain continues improved.     HLD  BPH  Nutrition  GI ppx - senna-s 2 tab daily; 100mg colace BID; encouraged mobility/hydration     DVT prophylaxis: SCDs and Lovenox.    Thyroid-July 28-TSH elevated 7.1.  Check T4.  Subclinical hypothyroidism can be protective immediately after stroke.  July 29-Free T4 equal 1.45.  Subclinical hypothyroidism can be protective/beneficial status post recent stroke.  No changes in regimen.     CODE STATUS:    Level Of Support Discussed With: Patient  Code Status (Patient has no pulse  and is not breathing): CPR (Attempt to Resuscitate)  Medical Interventions (Patient has pulse or is breathing): Full Support    Now admit for comprehensive acute inpatient rehabilitation .  This would be an interdisciplinary program with physical therapy 1 hour,  occupational therapy 1 hour, and speech therapy 1 hour, 5 days a week.  Rehabilitation nursing for carryover, monitoring of neurologic   status, bowel and bladder, and skin  Ongoing physician follow-up.  Weekly team conferences.    Goal is for home with outpatient  therapies.  Barrier to discharge:  impaired mobility and self-care - work on   strength, coordination, balance, progressive ambulation, ADLs     to overcome.     TEAM CONF - July 26 - BED CTG. TRANSFERS MIN. GAIT 180 FEET LEFT PLATFORM RW, MIN ASSIST. WEARS B KNEE BRACES CHRONICALLY WHEN MORE ACTIVE.  TOILET TRANSFERS MIN. BATH MIN. UBD SBA. LBD MIN. GROOMING SBA. LIMITED BY LEFT 2ND MCP PAIN/ SWELLING - CT NEGATIVE FOR FRACTURE.  CLQT - MILD COGNITVE LINGUISTIC DEFICITS. DIFFICULTY WITH THOUGHT FLEXIBILITY, DELAYED RECALL AND DIVIDED ATTENTION. PASTORAL COUNSELING SEEN. PATIENT FEELS LOSS OF CONTROL - WILL FOLLOW. SKIN INTACT. DICLOFENAC CREAM TO LEFT 2ND MCP. CONTINENT BOWEL AND BLADDER.   ELOS - 1.5 TO 2 WEEKS.       TEAM CONF - AUGUST 2 -  BED MODIFIED INDEPENDENT ASSIST. TRANSFERS CTG. GAIT 300 RW CTG. TOILET TRANSFERS CTG SBA. SHOWER TRANSFERS CTG SBA. BATH SBA. LBD CTG. UBD SET UP. GROOMING SET UP TOILETING SBA. IMPAIRED MEMORY WITH TASKS. LEFT 2ND MCP IMPROVED, DECREASED SWELLING AND PAIN, BETTER ROM, SEEN IN FOLLOW UP BY HAND SURGERY AND NO INJECTION, CONTINUE COURSE OF COLCHICINE/ DICLOFENC CREAM. ICE TO LEFT HAND/ ICE/HEAT TO LEFT HIP. COGNITION - PROBLEM SOLVING, ATTENTION TO DETAIL, MEMORY, POOR AWARENESS OF ERRORS.   ELOS - END OF WEEK. OUTPATIENT OT,PT, SLP     Alvino Melendez MD    During rounds, used appropriate personal protective equipment including mask and gloves.   Additional gown if indicated.  Mask used was standard procedure mask. Appropriate PPE was worn during the entire visit.  Hand hygiene was completed before and after.

## 2022-08-02 NOTE — PLAN OF CARE
Goal Outcome Evaluation:    Pt complaining of vocal fatigue, review of chart indicated CVA in right nato.  Vocal exercises introduced and hand out given for vocal exercises to prevent vocal fatigue.

## 2022-08-02 NOTE — PROGRESS NOTES
Weekly progress report and discharge plans discussed with patient and patient's wife. Scheduled family conference for tomorrow, 08/03/22 at 10:00am. Discussed with patient's wife about recommendation of outpatient therapies after d/c. Patient spoke with patient's PCP (YESSI Farris) about covering outpatient therapies.  [facility] Garcia will need a note stating the need for outpatient therapies from Dr. Melendez and then patient will need an initial f/u appointment with Dr. Garcia before being able to choose where he would like to complete his outpatient therapies.

## 2022-08-02 NOTE — PROGRESS NOTES
Case Management  Inpatient Rehabilitation Team Conference    Conference Date/Time: 8/2/2022 8:05:58 AM    Team Conference Attendees:  Dr. Alvino Lazo, Pharmacist  Tawnya Scales, MSW  Keiry Roth, PTA  Tiki Quinones, PT  Phoebe Healy, OT  Roselia Ibrahim, SLP  Stephy Vivas, CTRS  Chetna Barton RD, LD  Monalisa Manley, RN  Maris Jefferson, RN  Chaplain Lyle    Demographics            Age: 76Y            Gender: Male    Admission Date: 7/23/2022 3:15:00 PM  Rehabilitation Diagnosis:  R MCA CVA w/ L Hemiparesis  ?  Past Medical History: Past Medical History:  DiagnosisDate  ?Arthritis?  ?Arthritis of back?  ?Arthritis of knee?  ?BPH (benign prostatic hyperplasia)?  ?Cancer (HCC)?  ?Cancer of kidney (HCC)?  ?COVID?  ?Rheumatic fever?  ?Stroke (HCC)?    ?  ?  Surgical History  Past Surgical History:  ProcedureLateralityDate  ?CYST REMOVAL??  ?NEPHRECTOMY??  ?SOFT TISSUE BIOPSY??      Plan of Care  Anticipated Discharge Date/Estimated Length of Stay: ELOS: 1 1/2 - 2 weeks  Anticipated Discharge Destination: Community discharge with assistance  Discharge Plan : Patient lives with his wife in a quad level home with 2 steps  to enter. Patient's bedroom and main bathroom are on the 2nd floor, but patient  will be moving his bedroom to a bedroom on the 1st floor so he does not have to  climb the 12 steps to the 2nd floor. D/c plan is for patient to d/c home with  his wife being able to take time off of work to provide assistance.  Medical Necessity Expected Level Rationale: Goals are to achieve a level of SBA  with ADL's and mobility.  Medical prognosis fair.  Rehab prognosis fair.  Intensity and Duration: an average of 3 hours/5 days per week  Medical Supervision and 24 Hour Rehab Nursing: x  Physical Therapy: x  PT Intensity/Duration: 1 hour / day, 5 days / week, for approximately 2 weeks.  Occupational Therapy: x  OT Intensity/Duration: 1 hour / day, 5 days / week, for approximately 2 weeks.  Speech and  Language Therapy: x  SLP Intensity/Duration: 1 hour / day, 5 days / week, for approximately 2 weeks.  Social Work: x  Therapeutic Recreation: x  Psychology: x  Registered Dietician: x  Updated (if changes indicated)    Anticipated Discharge Date/Estimated Length of Stay:   ELOS: DC Fri 8/5    Based on the patient's medical and functional status, their prognosis and  expected level of functional improvement is: Goals are to achieve a level of SBA  with ADL's and mobility.  Medical prognosis fair.  Rehab prognosis fair.      Interdisciplinary Problem/Goals/Status    All Rehab Problems:  Cognition    [ST] Executive Functions(Active)  Current Status(08/01/2022): Mild cognitive linguistic deficits ( attn, exec fxn,  memory); difficulty with thought flexibility, delayed recall, and divided attn  Weekly Goal(08/08/2022): Pt will participate in structured therapy activities  given min cues  Discharge Goal: fxnl cognition for home with intermittent assist        Mobility    [PT] Bed/Chair/Wheelchair(Active)  Current Status(08/01/2022): CGA  Weekly Goal(08/09/2022): SBA  Discharge Goal: SBA    [PT] Bed Mobility(Active)  Current Status(08/01/2022): Mod Indep  Weekly Goal(08/09/2022): Mod Indep  Discharge Goal: Mod Indep    [PT] Stairs(Active)  Current Status(08/01/2022): 12, 2 rails, CGA  Weekly Goal(08/09/2022): PT only  Discharge Goal: 12, 1 rail, CGA    [PT] Walk(Active)  Current Status(08/01/2022): As far as 320', L platform Rwx, CGA. Able to use Rwx  w/o platform CGA  Weekly Goal(08/09/2022): To BR, Rwx, SBA  Discharge Goal: 200', AAD, SBA    [OT] Toilet Transfers(Active)  Current Status(08/01/2022): CGA/SBA  Weekly Goal(08/08/2022): SBA  Discharge Goal: SBA    [OT] Tub/Shower Transfers(Active)  Current Status(08/01/2022): CGA/SBA  Weekly Goal(08/09/2022): SBA  Discharge Goal: SBA        Psychosocial    [RN] Coping/Adjustment(Active)  Current Status(08/02/2022): Expresses appropriate coping  Weekly Goal(08/09/2022): Allow  opportunity to express concerns regarding current  status  Discharge Goal: Allow opportunity to express concerns regarding life changes  with ongoing support        Safety    [RN] Potential for Injury(Active)  Current Status(08/02/2022): Pt has a hx of falls  Weekly Goal(08/09/2022): Pt will use call bell 100% of the time  Discharge Goal: No injury        Self Care    [OT] Bathing(Active)  Current Status(08/01/2022): SBA  Weekly Goal(08/08/2022): SBA  Discharge Goal: SBA    [OT] Dressing (Lower)(Active)  Current Status(08/01/2022): CGA  Weekly Goal(08/09/2022): SBA  Discharge Goal: SBA    [OT] Dressing (Upper)(Active)  Current Status(08/01/2022): set up  Weekly Goal(08/01/2022): set up  Discharge Goal: set up    [OT] Eating(Active)  Current Status(07/25/2022): I  Weekly Goal(08/01/2022): I  Discharge Goal: I    [OT] Grooming(Active)  Current Status(08/01/2022): set up  Weekly Goal(08/08/2022): Mod I  Discharge Goal: Mod I    [OT] Toileting(Active)  Current Status(08/01/2022): SBA  Weekly Goal(08/01/2022): SBA  Discharge Goal: SBA        Sphincter Control    [RN] Bladder Management(Active)  Current Status(08/02/2022): Pt is 100% cont of urine  Weekly Goal(08/09/2022): Remain 100% cont of urine  Discharge Goal: 100% cont of urine    [RN] Bowel Management(Active)  Current Status(08/02/2022): Continent of bowel 100%  Weekly Goal(08/09/2022): Formed BM daily  Discharge Goal: Regular bowel program        Comments: 7/26 CGA bed mobility, amb 180' left platform rwx, Min A VC's.  Impulsive with the walker.  Toilet transfer Min A.  Plan for shower today.  Min  A lower body drsg.  CLQT mildly impaired- delayed recall, difficulty with  divided attention.  Continent of urine, no BM yet.    8/2 Transfers CGA, amb 320' platform rwx CGA.  Able to also use rwx without  platform CGA.  Can do a flight of stairs CGA and bilateral rails.  Toilet and  tub transfers CGA / SBA.  Toileting SBA.  Still working on attention to  detail,  executive function and memory tasks.  Continent of bowel and bladder.  Still  with pain to left hip.    Signed by: Moanlisa Manley RN    Physician CoSigned By: Alvino Melendez 08/02/2022 08:56:17

## 2022-08-02 NOTE — THERAPY TREATMENT NOTE
"Inpatient Rehabilitation - Physical Therapy Treatment Note       [facility]     Patient Name: Shiela Turk Jr.  : 1945  MRN: [id number]    Today's Date: 2022                    Admit Date: 2022      Visit Dx:     ICD-10-CM ICD-9-CM   1. Impaired gait and mobility  R26.89 781.2   2. Follow-up exam  Z09 V67.9   3. Right pontine stroke (HCC)  I63.50 434.91       Patient Active Problem List   Diagnosis   • Right pontine stroke (HCC)       Past Medical History:   Diagnosis Date   • Arthritis    • Arthritis of back    • Arthritis of knee    • BPH (benign prostatic hyperplasia)    • Cancer (HCC)    • Cancer of kidney (HCC)    • COVID    • Rheumatic fever    • Stroke (HCC)        Past Surgical History:   Procedure Laterality Date   • CYST REMOVAL     • NEPHRECTOMY     • SOFT TISSUE BIOPSY         PT ASSESSMENT (last 12 hours)     IRF PT Evaluation and Treatment     Row Name 22 0844          PT Time and Intention    Document Type daily treatment  -LB     Mode of Treatment physical therapy  -LB     Patient/Family/Caregiver Comments/Observations Supine in bed. NAD.  -LB     Row Name 22 0844          General Information    General Observations of Patient \"my hip is feeling better today\"  -LB     Existing Precautions/Restrictions fall  -LB     Row Name 2244          Pain Assessment    Pretreatment Pain Rating 4/10  -LB     Pain Location - Side/Orientation Left  -LB     Pain Location - hip;hand;finger  -LB     Pre/Posttreatment Pain Comment premedicated  -LB     Row Name 2244          Bed Mobility    Supine-Sit [place] (Bed Mobility) modified independence  -LB     Row Name 22 0844          Transfers    Bed-Chair [place] (Transfers) contact guard;standby assist  -LB     Assistive Device (Bed-Chair Transfers) wheelchair  -LB     Sit-Stand [place] (Transfers) contact guard;standby assist  -LB     Stand-Sit [place] (Transfers) standby assist  -LB     Row " Name 08/02/22 0844          Sit-Stand Transfer    Assistive Device (Sit-Stand Transfers) walker, front-wheeled;wheelchair  -LB     Row Name 08/02/22 0844          Stand-Sit Transfer    Assistive Device (Stand-Sit Transfers) walker, front-wheeled;wheelchair  -LB     Row Name 08/02/22 0844          Gait/Stairs (Locomotion)    Warner Level (Gait) contact guard;standby assist  -LB     Assistive Device (Gait) walker, front-wheeled  w/o platform  -LB     Distance in Feet (Gait) 260' to as far as 440'  -LB     Pattern (Gait) step-through  -LB     Deviations/Abnormal Patterns (Gait) base of support, narrow;antalgic;stride length decreased  -LB     Bilateral Gait Deviations forward flexed posture  -LB     Left Sided Gait Deviations decreased knee extension;heel strike decreased  -LB     Warner Level (Stairs) contact guard;verbal cues  -LB     Handrail Location (Stairs) left side (ascending);right side (descending)  -LB     Number of Steps (Stairs) 12  -LB     Ascending Technique (Stairs) step-to-step  -LB     Descending Technique (Stairs) step-to-step  -LB     Row Name 08/02/22 0844          Curb Negotiation (Mobility)    Warner, Curb Negotiation contact guard;verbal cues  -LB     Assistive Device (Curb Negotiation) walker, front-wheeled  -LB     Row Name 08/02/22 0844          Rough/Uneven Surface Gait Skills (Mobility)    Warner, Gait on Rough/Uneven Surface (Mobility) contact guard  -LB     Assistive Device (Rough/Uneven Surface Gait) walker, front-wheeled  -LB     Distance in Feet (Rough/Uneven Surface Gait) 6  -LB     Comment, Gait Rough/Uneven Surface (Mobility) red mat  -LB     Row Name 08/02/22 0844          Balance    Comment, Balance alt toe taps at // bars cga  -LB     Row Name 08/02/22 0844          Hip (Therapeutic Exercise)    Hip Strengthening (Therapeutic Exercise) bilateral;aBduction;flexion;sitting;resistance band;red;15 repititions  -LB     Row Name 08/02/22 0844          Knee  (Therapeutic Exercise)    Knee Strengthening (Therapeutic Exercise) bilateral;flexion;LAQ (long arc quad);sitting;resistance band;red;15 repititions  lift ups x 2 reps at // bars. partial knee bends x 10 reps  -LB     Row Name 08/02/22 0844          Ankle (Therapeutic Exercise)    Ankle Strengthening (Therapeutic Exercise) bilateral;plantarflexion;standing;10 repetitions  bilat plantar flex/dorsiflex w red T-Band x 15 reps  -LB     Row Name 08/02/22 0844          Positioning and Restraints    Post Treatment Position wheelchair  -LB     In Wheelchair sitting;exit alarm on;with SLP  -LB           User Key  (r) = Recorded By, (t) = Taken By, (c) = Cosigned By    Initials Name Provider Type    Keiry Madrigal PTA Physical Therapist Assistant                 Physical Therapy Education                 Title: PT OT SLP Therapies (In Progress)     Topic: Physical Therapy (In Progress)     Point: Mobility training (Done)     Learning Progress Summary           Patient Acceptance, E, VU,NR by LB at 8/2/2022 1418    Comment: stairs    Acceptance, E, NR by LB at 8/1/2022 1337    Comment: car tsf, stairs    Acceptance, E, NR by LB at 7/30/2022 1213    Comment: stairs    Acceptance, E, NR by LB at 7/29/2022 1453    Acceptance, E,D, NR by KP at 7/28/2022 1513    Acceptance, E, NR by LB at 7/27/2022 0959    Comment: car tsf    Acceptance, E, NR by LB at 7/26/2022 0957    Comment: stairs, curb    Acceptance, E, NR by LB at 7/25/2022 0955    Comment: Amb    Acceptance, E,TB, VU,NR by JORGE LUIS at 7/24/2022 1148    Comment: not to get up on his own, call for assist                   Point: Home exercise program (In Progress)     Learning Progress Summary           Patient Acceptance, E,D, NR by  at 7/28/2022 1513                   Point: Body mechanics (Not Started)     Learner Progress:  Not documented in this visit.          Point: Precautions (Not Started)     Learner Progress:  Not documented in this visit.                       User Key     Initials Effective Dates Name Provider Type Discipline    JORGE LUIS 06/16/21 -  Kanwal Coleman, PT Physical Therapist PT    LB 03/07/18 -  Keiry Roth PTA Physical Therapist Assistant PT     06/16/21 -  Destiny Steiner, PT Physical Therapist PT                PT Recommendation and Plan      Patient was wearing a face mask during this therapy encounter. Therapist used appropriate personal protective equipment including mask and gloves.  Mask used was standard procedure mask. Appropriate PPE was worn during the entire therapy session. Hand hygiene was completed before and after therapy session. Patient is not in enhanced droplet precautions.                        Time Calculation:      PT Charges     Row Name 08/02/22 1242 08/02/22 0844          Time Calculation    Start Time 1230  -LB 0830  -LB     Stop Time 1300  -LB 0930  -LB     Time Calculation (min) 30 min  -LB 60 min  -LB           User Key  (r) = Recorded By, (t) = Taken By, (c) = Cosigned By    Initials Name Provider Type    LB Keiry Roth PTA Physical Therapist Assistant                Therapy Charges for Today     Code Description Service Date Service Provider Modifiers Qty    37868257823 HC PT THER PROC EA 15 MIN 8/1/2022 Keiry Roth PTA GP 6    27780143126 HC PT THER PROC EA 15 MIN 8/2/2022 Keiry Roth PTA GP 6                   Keiry Roth PTA  8/2/2022

## 2022-08-02 NOTE — PROGRESS NOTES
Inpatient Rehabilitation Plan of Care Note    Plan of Care  Care Plan Reviewed - No updates at this time.    Safety    Performed Intervention(s)  Falls protocal  Btrm schdule every 4 hours  items within reach      Sphincter Control    Performed Intervention(s)  Montior I and O  Stool softener as needed  Bowel program of choice  Btrm schedule      Psychosocial    Performed Intervention(s)  Support/peer groups  Verbalizes needs and concerns    Signed by: Maris Jefferson RN

## 2022-08-03 ENCOUNTER — APPOINTMENT (OUTPATIENT)
Dept: CT IMAGING | Facility: HOSPITAL | Age: 77
End: 2022-08-03

## 2022-08-03 PROCEDURE — 97130 THER IVNTJ EA ADDL 15 MIN: CPT

## 2022-08-03 PROCEDURE — 94760 N-INVAS EAR/PLS OXIMETRY 1: CPT

## 2022-08-03 PROCEDURE — 94799 UNLISTED PULMONARY SVC/PX: CPT

## 2022-08-03 PROCEDURE — 73700 CT LOWER EXTREMITY W/O DYE: CPT

## 2022-08-03 PROCEDURE — 94761 N-INVAS EAR/PLS OXIMETRY MLT: CPT

## 2022-08-03 PROCEDURE — 25010000002 ENOXAPARIN PER 10 MG: Performed by: PHYSICAL MEDICINE & REHABILITATION

## 2022-08-03 PROCEDURE — 94664 DEMO&/EVAL PT USE INHALER: CPT

## 2022-08-03 PROCEDURE — 97110 THERAPEUTIC EXERCISES: CPT

## 2022-08-03 PROCEDURE — 97129 THER IVNTJ 1ST 15 MIN: CPT

## 2022-08-03 RX ADMIN — DICLOFENAC SODIUM 2 G: 10 GEL TOPICAL at 17:13

## 2022-08-03 RX ADMIN — DICLOFENAC SODIUM 2 G: 10 GEL TOPICAL at 20:35

## 2022-08-03 RX ADMIN — DOCUSATE SODIUM 100 MG: 100 CAPSULE, LIQUID FILLED ORAL at 20:35

## 2022-08-03 RX ADMIN — DICLOFENAC SODIUM 2 G: 10 GEL TOPICAL at 13:15

## 2022-08-03 RX ADMIN — COLCHICINE 0.6 MG: 0.6 TABLET, FILM COATED ORAL at 08:10

## 2022-08-03 RX ADMIN — HYDROCODONE BITARTRATE AND ACETAMINOPHEN 1 TABLET: 5; 325 TABLET ORAL at 08:10

## 2022-08-03 RX ADMIN — DOCUSATE SODIUM 100 MG: 100 CAPSULE, LIQUID FILLED ORAL at 08:10

## 2022-08-03 RX ADMIN — ENOXAPARIN SODIUM 40 MG: 100 INJECTION SUBCUTANEOUS at 20:35

## 2022-08-03 RX ADMIN — BUDESONIDE AND FORMOTEROL FUMARATE DIHYDRATE 2 PUFF: 160; 4.5 AEROSOL RESPIRATORY (INHALATION) at 19:42

## 2022-08-03 RX ADMIN — ASPIRIN 81 MG: 81 TABLET, COATED ORAL at 08:10

## 2022-08-03 RX ADMIN — DICLOFENAC SODIUM 2 G: 10 GEL TOPICAL at 08:12

## 2022-08-03 RX ADMIN — TAMSULOSIN HYDROCHLORIDE 0.4 MG: 0.4 CAPSULE ORAL at 08:10

## 2022-08-03 RX ADMIN — BUDESONIDE AND FORMOTEROL FUMARATE DIHYDRATE 2 PUFF: 160; 4.5 AEROSOL RESPIRATORY (INHALATION) at 07:21

## 2022-08-03 NOTE — PLAN OF CARE
Goal Outcome Evaluation:  Plan of Care Reviewed With: patient           Outcome Evaluation: Patient is a new admision here for rehabilitation due to diagnosis of Stroke, some defect to left extremities but able to tolerate activities well, continue to complain of left arm  and hip pain this shift, decline pain medication but accept Voltran gel and effective, able to make needs known alert and oriented X4,continue to keep room dark (photophobia), regular diet with thin liquids

## 2022-08-03 NOTE — PROGRESS NOTES
[facility]     Progress Note    Patient Name: Shiela Turk Jr.  : 1945  MRN: [id number]  Primary Care Physician:  Lay Ordonez MD  Date of admission: 2022    Subjective   Subjective     Chief Complaint: Functional Impairments related to R MCA CVA w/ L Hemiparesis    History of Present Illness       He felt a little lightheaded earlier today but better when seen on rounds.  Does not describe any new focal weakness.  Still has some forgetfulness.  Has decreased intake-does not like the options.  Reviewed can have findings brought in from home.  Agreeable to trying mighty shakes.  Left hip soreness about the same.  Left hand pain better.            Objective   Objective     Vitals:   Temp:  [97 °F (36.1 °C)-98.5 °F (36.9 °C)] 97 °F (36.1 °C)  Heart Rate:  [62-68] 62  Resp:  [16-20] 16  BP: ()/(57-73) 119/73    Physical Exam   Vitals reviewed.   Constitutional:       General: He is not in acute distress.     Appearance: Normal appearance.   HENT:       Cardiovascular:      Rate and Rhythm: Normal rate and regular rhythm.      Pulses: Normal pulses.      Heart sounds: No murmur heard.    No friction rub. No gallop.   Pulmonary:      Effort: Pulmonary effort is normal. No respiratory distress.      Breath sounds: Normal breath sounds. No wheezing or rales.   Abdominal:      General: Bowel sounds are normal. There is no distension.      Palpations: Abdomen is soft.      Tenderness: There is no abdominal tenderness.   Musculoskeletal:         General: No tenderness.     No significant edema in the bilateral lower extremities.       L 2nd MCP joint much improved with significantly decreased swelling and tenderness      Skin:     General: Skin is warm and dry.      Findings: No erythema.   Neurological:      Mental Status: He is alert and oriented to person, place, and time.   it.      Motor: Weakness present.      Comments: Appropriate in conversation; no aphasia; RUE/RLE 5/5; L  shoulder abduction 4/5; L EF/EE/WE 4/5; good finger flexion range of motion and takes resistance better with less pain.  LLE 4/5      Psychiatric:         Mood and Affect: Mood normal.         Behavior: Behavior normal.     Result Review    Result Review:    Results from last 7 days   Lab Units 08/01/22  0451 07/28/22  0546   WBC 10*3/mm3 4.62 6.16   HEMOGLOBIN g/dL 15.3 15.5   HEMATOCRIT % 44.5 45.6   PLATELETS 10*3/mm3 316 293     Results from last 7 days   Lab Units 08/01/22  0451 07/28/22  0546   SODIUM mmol/L 137 139   POTASSIUM mmol/L 4.0 4.1   CHLORIDE mmol/L 103 103   CO2 mmol/L 26.9 25.7   BUN mg/dL 10 12   CREATININE mg/dL 0.94 0.97   CALCIUM mg/dL 9.2 9.1   GLUCOSE mg/dL 82 92          Latest Reference Range & Units 07/25/22 05:56 07/26/22 11:55 07/27/22 16:44 07/28/22 05:46   Uric Acid 3.4 - 7.0 mg/dL  6.6     TSH Baseline 0.270 - 4.200 uIU/mL    7.110 (H)   Iron 59 - 158 mcg/dL   29 (L)    Iron Saturation 20 - 50 %   11 (L)    Transferrin 200 - 360 mg/dL   185 (L)    TIBC 298 - 536 mcg/dL   276 (L)    C-Reactive Protein 0.00 - 0.50 mg/dL   10.28 (H)    Magnesium 1.6 - 2.4 mg/dL   1.9    Vitamin B-12 211 - 946 pg/mL  659     25 Hydroxy, Vitamin D 30.0 - 100.0 ng/ml 20.0 (L)         Latest Reference Range & Units 07/28/22 05:46   Free T4 0.93 - 1.70 ng/dL 1.45         Most notable findings include: L hand plain films - 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 Minimal  degenerative spurring is apparent at the left femoral head. Hip joint  spaces appear preserved. Degenerative changes are noted in the partly  included lumbar spine. Follow-up/further evaluation can be obtained as  indications persist.     LEFT HAND CT WITHOUT CONTRAST JULY  25, 2022     HISTORY: Wrist and hand pain around the 2nd metacarpophalangeal joint  with swelling after a fall one week ago.     TECHNIQUE: Axial CT of the left hand and wrist with multiplanar  reformatted images is provided and correlated with hand x-rays acquired  yesterday.     Radiation dose reduction techniques were utilized, including automated  exposure control and exposure modulation based on body size.     FINDINGS: There is arthritic change throughout the hand and the wrist  with marginal osteophytes, subcortical cysts, and irregular joint space  narrowing. There is no evidence of fracture around the 2nd  metacarpophalangeal joint or elsewhere in the hand or the wrist. No  joint effusion or other focal fluid collection is present. There appears  to be subcutaneous edema around the thenar side of the 2nd digit at the  level of the metacarpophalangeal joint. As can best be appreciated with  this modality, the collateral ligaments at that articulation appear to  be intact and the flexor and extensor tendons in the hand and the wrist  appear intact. Hand musculature is normal. There is no soft tissue cyst  or mass.     IMPRESSION:  Advanced, chronic arthritic change at the hand and the  wrist. Mild subcutaneous edema along the thenar side of the 2nd digit  around the metacarpophalangeal joint. However, there is no CT evidence  of fracture.      Assessment & Plan   Assessment / Plan     Brief Patient Summary:  Shiela Turk Jr. is a 76 y.o. male w/ functional impairments 2/2 R pontine/lacunar infacrt    Active Hospital Problems:  Active Hospital Problems    Diagnosis    • Right pontine stroke (HCC)      Plan:   R Pontine/Lacunar CVA  - secondary stroke ppx  - monitor BP; LDL goal <70  - Vit B12 and D levels ordered to see if supplementation indicated  July 25-vitamin D deficiency-replace.     L Hemiparesis  - will benefit from functional e-stim  - may benefit from L AFO  - recommend to defer SSRI at this  time    L Hand and L Hip Pain  - thought to be from fall  - L hand plain films personally reviewed - osteoarthritis changes seen in 1st MCP; no acute Fx or dislocation  - will trial topical diclofenac; ice therapy  - PRN norco  July 25-still with pain and increased swelling at the second MCP joint.  Swelling in the digit.  Will get CT of the hand to assess for any fracture not seen on plain x-ray.  Also obtain x-ray of the left hip-both imaging negative for fracture or any evidence of ligamentous injury given limitations of  the techniques.  July 26 - Continues with pain at the left second MCP joint with swelling that goes along the digit.  Does not describe any numbness at the distal digit.  He has pain at the MCP joint with palpation as well as with any active or passive movement.  He relates the onset of the pain to when he had the fall at home a week ago.  Reviewed plain x-ray and CT scan did not show any fracture.  As it is temporally related to a fall at home, one concern would be for ligamentous injury.  Will get hand surgery consultation to assess further.  We will presently defer any MRI imaging to hand surgery-he had recent loop recorder placed that is MRI compatible with some adjustments in the protocol such as 1.5 magnet.  Discussed the other considerations could be coincidental gout and will check a uric acid level.  He denies any past history of gout. Add: uric acid 6.6 WNL  July 27-Nursing and Occupational Therapy report increased pain and swelling at the left 2nd MCP joint.  No fever.  Has been on diclofenac gel for 2.5 days but has declined a couple of doses.  On questioning on Monday and Tuesday, the patient related the onset to when he had a fall at home around July 19.  However, given the progressive nature of his symptoms, one consideration could be possible gout and  will do a trial of colchicine 0.6 mg now, second dose in 6 hours, then daily for 5 days after that.  Hold atorvastatin while on  colchicine.  Evaluated by hand surgery-continue present regimen.  If not improved by Monday on follow-up, will consider Kenalog injection.  Labs today showed WBC 6.7 with 55 neutrophils, CRP elevated 10.78, sed rate elevated 53, iron low at 29, hemoglobin 15.9.  July 28-still with pain but looks less swollen at the second MCP joint and less tender along the digit.  In occupational therapy also notes less swelling and pain at the left hand today.  Reviewed patient continue with plan for diclofenac topically and colchicine.    July 29-left midfoot-dorsal-pain.  No trauma.  No warmth erythema.  May have some component of gout at the left midfoot.  We will continue with present colchicine.  Describes the pain with weightbearing but not at rest.  Will defer any imaging presently.  No swelling in the leg.  Homans negative.  July 30 - Pain improving. Suspect gout given hx. Monitor  August 1-left index finger MCP joint much better.  - will continue colchicine for couple more days.  August 2-visible contour difference at the left buttock and lateral hip compared to the right and area appears firm and not soft.  Appears firmer than the hematoma.  - will check CT scan.  X-ray previously was negative for fracture.  Still with pain at the lateral hip but ambulating 300 feet.  Finger pain continues improved.  Addendum-CT left hip without fracture or hematoma.  Has arthritic changes in the pelvis.     HLD  BPH  Nutrition  GI ppx - senna-s 2 tab daily; 100mg colace BID; encouraged mobility/hydration     DVT prophylaxis: SCDs and Lovenox.    Thyroid-July 28-TSH elevated 7.1.  Check T4.  Subclinical hypothyroidism can be protective immediately after stroke.  July 29-Free T4 equal 1.45.  Subclinical hypothyroidism can be protective/beneficial status post recent stroke.  No changes in regimen.     CODE STATUS:    Level Of Support Discussed With: Patient  Code Status (Patient has no pulse and is not breathing): CPR (Attempt to  Resuscitate)  Medical Interventions (Patient has pulse or is breathing): Full Support    Now admit for comprehensive acute inpatient rehabilitation .  This would be an interdisciplinary program with physical therapy 1 hour,  occupational therapy 1 hour, and speech therapy 1 hour, 5 days a week.  Rehabilitation nursing for carryover, monitoring of neurologic   status, bowel and bladder, and skin  Ongoing physician follow-up.  Weekly team conferences.    Goal is for home with outpatient  therapies.  Barrier to discharge:  impaired mobility and self-care - work on   strength, coordination, balance, progressive ambulation, ADLs     to overcome.     TEAM CONF - July 26 - BED CTG. TRANSFERS MIN. GAIT 180 FEET LEFT PLATFORM RW, MIN ASSIST. WEARS B KNEE BRACES CHRONICALLY WHEN MORE ACTIVE.  TOILET TRANSFERS MIN. BATH MIN. UBD SBA. LBD MIN. GROOMING SBA. LIMITED BY LEFT 2ND MCP PAIN/ SWELLING - CT NEGATIVE FOR FRACTURE.  CLQT - MILD COGNITVE LINGUISTIC DEFICITS. DIFFICULTY WITH THOUGHT FLEXIBILITY, DELAYED RECALL AND DIVIDED ATTENTION. PASTORAL COUNSELING SEEN. PATIENT FEELS LOSS OF CONTROL - WILL FOLLOW. SKIN INTACT. DICLOFENAC CREAM TO LEFT 2ND MCP. CONTINENT BOWEL AND BLADDER.   [facility] - 1.5 TO 2 WEEKS.       TEAM CONF - AUGUST 2 -  BED MODIFIED INDEPENDENT ASSIST. TRANSFERS CTG. GAIT 300 RW CTG. TOILET TRANSFERS CTG SBA. SHOWER TRANSFERS CTG SBA. BATH SBA. LBD CTG. UBD SET UP. GROOMING SET UP TOILETING SBA. IMPAIRED MEMORY WITH TASKS. LEFT 2ND MCP IMPROVED, DECREASED SWELLING AND PAIN, BETTER ROM, SEEN IN FOLLOW UP BY HAND SURGERY AND NO INJECTION, CONTINUE COURSE OF COLCHICINE/ DICLOFENC CREAM. ICE TO LEFT HAND/ ICE/HEAT TO LEFT HIP. COGNITION - PROBLEM SOLVING, ATTENTION TO DETAIL, MEMORY, POOR AWARENESS OF ERRORS.   ELOS - END OF WEEK. OUTPATIENT OT,PT, SLP     Alvino Melendez MD    During rounds, used appropriate personal protective equipment including mask and gloves.  Additional gown if indicated.  Mask used  was standard procedure mask. Appropriate PPE was worn during the entire visit.  Hand hygiene was completed before and after.

## 2022-08-03 NOTE — PROGRESS NOTES
Family conference held today, 08/03/22. Discussed pt's progress, current status and discharge plans. Discussed patient's [employer] insurance paying for outpatient therapies after having an initial visit with his PCP.     PT: PT is working on patient's functional mobility, transfer in and out of the car, and walking up and down steps. Patient is SBA for car transfers. Patient is ambulating 200-300' using the rwx. Patient needs some cueing for slowing down, especially around turns. Patient is SBA for all transfers. Patient is SBA for completing a whole flight of stairs and uses the rail while ambulating on the steps. PT is recommending Outpatient PT and a home health exercise program.       OT: OT is working on patient completing ADLs. Patient is SBA for all activities of daily living and self-care. Patient's transfer to commode and transfers to the shower are SBA. For dressing patient is SBA for upper body dress and lower body dressing. OT has started working on strengthening and coordinating exercises for patients left hand. OT is recommending outpatient OT and a home exercise program.      ST: ST has been working on cognitive skills and higher level thinking. Patient has difficulties with working memory and taking in new information. Patient has also difficulties with home management tasks such as sorting medications in a daily pill organizer and balancing a check book. Patient's wife will need to assist patient with these tasks for at least the first couple of weeks after d/c.      NSG: Discussed patients medication list and patients f/u appointments with Neurology, Dr. Melendez, sleep study, and PCP.     Discussed equipment for home. Patient will need a rolling walker. Patient has a shower chair.      D/C date discussed and patient to d/c Friday, August 5th, 2022. Patient will d/c home with wife and wife will be able to provide 24/7 SBA as she has quit her job stay home with patient.

## 2022-08-03 NOTE — PLAN OF CARE
Problem: Skin Injury Risk Increased  Goal: Skin Health and Integrity  Outcome: Ongoing, Progressing   Goal Outcome Evaluation:  Plan of Care Reviewed With: patient           Outcome Evaluation: Patient contijue on rehabilitation due to diagnosis of Stroke, some defect to left extremities but able to tolerate activities well, continue to complain of left arm  and hip pain this shift,  pain medication, Voltran gel given and effective, able to make needs known alert and oriented X4,continue to keep room dark  regular diet with thin liquids, called for time on patients CT Scan, but they indicates they will be here this morning

## 2022-08-03 NOTE — THERAPY TREATMENT NOTE
Inpatient Rehabilitation - Occupational Therapy Treatment Note    [facility]     Patient Name: Shiela Turk Jr.  : 1945  MRN: [id number]    Today's Date: 8/3/2022                 Admit Date: 2022         ICD-10-CM ICD-9-CM   1. Impaired gait and mobility  R26.89 781.2   2. Follow-up exam  Z09 V67.9   3. Right pontine stroke (HCC)  I63.50 434.91       Patient Active Problem List   Diagnosis   • Right pontine stroke (HCC)       Past Medical History:   Diagnosis Date   • Arthritis    • Arthritis of back    • Arthritis of knee    • BPH (benign prostatic hyperplasia)    • Cancer (HCC)    • Cancer of kidney (HCC)    • COVID    • Rheumatic fever    • Stroke (HCC)        Past Surgical History:   Procedure Laterality Date   • CYST REMOVAL     • NEPHRECTOMY     • SOFT TISSUE BIOPSY               IRF OT ASSESSMENT FLOWSHEET (last 12 hours)     IRF OT Evaluation and Treatment     Row Name 22 1511          OT Time and Intention    Document Type daily treatment  -CC     Mode of Treatment occupational therapy  -CC     Patient Effort good  -CC     Symptoms Noted During/After Treatment none  -CC     Evaluation/Treatment Not Performed patient/family declined, not feeling well  pm session  -CC     Row Name 22 1511          Pain Assessment    Pretreatment Pain Rating 0/10 - no pain  -CC     Posttreatment Pain Rating 0/10 - no pain  -CC     Row Name 22 1511          Cognition/Psychosocial    Affect/Mental Status (Cognition) WFL  -CC     Orientation Status (Cognition) oriented x 3  -CC     Personal Safety Interventions fall prevention program maintained;gait belt;nonskid shoes/slippers when out of bed  -CC     Row Name 22 1511          Motor Skills    Coordination fine motor deficit;left  coompleted purdue peg board w min difficulty w washers. Pt unable to use 2 pt pinch with digits 3-5 to remove pieces from board. Pt shown opposition exercises to do in room  -CC     Row Name 22 1511           Balance    Static Sitting Balance independent  -     Row Name 08/03/22 1511          Positioning and Restraints    Pre-Treatment Position sitting in chair/recliner  -CC     Post Treatment Position wheelchair  -CC     In Wheelchair sitting;call light within reach;encouraged to call for assist;exit alarm on;with family/caregiver  -CC           User Key  (r) = Recorded By, (t) = Taken By, (c) = Cosigned By    Initials Name Effective Dates    CC Phoebe Healy, OTR 06/16/21 -                  Occupational Therapy Education                 Title: PT OT SLP Therapies (In Progress)     Topic: Occupational Therapy (Done)     Point: ADL training (Done)     Description:   Instruct learner(s) on proper safety adaptation and remediation techniques during self care or transfers.   Instruct in proper use of assistive devices.              Learning Progress Summary           Patient Acceptance, E, VU by CC at 8/3/2022 1517    Comment: Family conf with pt and spouse. Status, DME, followup therapy and home needs discussed. Pt has all needed DME at home    Acceptance, E, VU by CC at 7/25/2022 1243    Comment: Explanation of OT services and evaluation results.   Family Acceptance, E, VU by CC at 8/3/2022 1517    Comment: Family conf with pt and spouse. Status, DME, followup therapy and home needs discussed. Pt has all needed DME at home                   Point: Home exercise program (Done)     Description:   Instruct learner(s) on appropriate technique for monitoring, assisting and/or progressing therapeutic exercises/activities.              Learning Progress Summary           Patient Acceptance, E, VU by CC at 8/3/2022 1517    Comment: Family conf with pt and spouse. Status, DME, followup therapy and home needs discussed. Pt has all needed DME at home    Acceptance, E, VU by CC at 7/25/2022 1243    Comment: Explanation of OT services and evaluation results.   Family Acceptance, E, VU by CC at 8/3/2022 1517    Comment: Family  conf with pt and spouse. Status, DME, followup therapy and home needs discussed. Pt has all needed DME at home                   Point: Precautions (Done)     Description:   Instruct learner(s) on prescribed precautions during self-care and functional transfers.              Learning Progress Summary           Patient Acceptance, E, VU by CC at 8/3/2022 1517    Comment: Family conf with pt and spouse. Status, DME, followup therapy and home needs discussed. Pt has all needed DME at home    Acceptance, E, VU by CC at 7/25/2022 1243    Comment: Explanation of OT services and evaluation results.   Family Acceptance, E, VU by CC at 8/3/2022 1517    Comment: Family conf with pt and spouse. Status, DME, followup therapy and home needs discussed. Pt has all needed DME at home                   Point: Body mechanics (Done)     Description:   Instruct learner(s) on proper positioning and spine alignment during self-care, functional mobility activities and/or exercises.              Learning Progress Summary           Patient Acceptance, E, VU by CC at 8/3/2022 1517    Comment: Family conf with pt and spouse. Status, DME, followup therapy and home needs discussed. Pt has all needed DME at home    Acceptance, E, VU by CC at 7/25/2022 1243    Comment: Explanation of OT services and evaluation results.   Family Acceptance, E, VU by CC at 8/3/2022 1517    Comment: Family conf with pt and spouse. Status, DME, followup therapy and home needs discussed. Pt has all needed DME at home                               User Key     Initials Effective Dates Name Provider Type Discipline     06/16/21 -  Phoebe Healy OTR Occupational Therapist OT                    OT Recommendation and Plan    Anticipated Discharge Disposition (OT): home with assist  Planned Therapy Interventions (OT): activity tolerance training, adaptive equipment training, BADL retraining, cognitive/visual perception retraining, IADL retraining, functional balance  retraining, patient/caregiver education/training, ROM/therapeutic exercise, strengthening exercise, transfer/mobility retraining                    Time Calculation:      Time Calculation- OT     Row Name 08/03/22 1100 08/03/22 0741          Time Calculation- OT    OT Start Time 1100  -CC --     OT Stop Time 1130  -CC --     OT Time Calculation (min) 30 min  -CC --     OT Non-Billable Time (min) 30 min  family conf  -CC --     OT Goal Re-Cert Due Date -- 08/08/22  -           User Key  (r) = Recorded By, (t) = Taken By, (c) = Cosigned By    Initials Name Provider Type    CC Phoebe Healy OTR Occupational Therapist              Therapy Charges for Today     Code Description Service Date Service Provider Modifiers Qty    10990354851 HC OT SELF CARE/MGMT/TRAIN EA 15 MIN 8/2/2022 Phoebe Healy OTR GO 2    31473782381 HC OT NEUROMUSC RE EDUCATION EA 15 MIN 8/2/2022 Phoebe Healy OTR GO 2                   NED Harman  8/3/2022

## 2022-08-03 NOTE — THERAPY TREATMENT NOTE
"Inpatient Rehabilitation - Speech Language Pathology Treatment Note    [facility]     Patient Name: Shiela Turk Jr.  : 1945  MRN: [id number]    Today's Date: 8/3/2022                   Admit Date: 2022       Visit Dx:      ICD-10-CM ICD-9-CM   1. Impaired gait and mobility  R26.89 781.2   2. Follow-up exam  Z09 V67.9   3. Right pontine stroke (HCC)  I63.50 434.91       Patient Active Problem List   Diagnosis   • Right pontine stroke (HCC)       Past Medical History:   Diagnosis Date   • Arthritis    • Arthritis of back    • Arthritis of knee    • BPH (benign prostatic hyperplasia)    • Cancer (HCC)    • Cancer of kidney (HCC)    • COVID    • Rheumatic fever    • Stroke (HCC)        Past Surgical History:   Procedure Laterality Date   • CYST REMOVAL     • NEPHRECTOMY     • SOFT TISSUE BIOPSY         SLP Recommendation and Plan                                                   SLP EVALUATION (last 72 hours)     SLP SLC Evaluation     Row Name 22 1345 22 0943 22 1330 22 0930 22 1400       Communication Assessment/Intervention    Document Type therapy note (daily note)  -SL therapy note (daily note)  -SL therapy note (daily note)  -CB therapy note (daily note)  -CB therapy note (daily note)  -SR    Subjective Information complains of;fatigue  -SL no complaints  -SL no complaints  -CB -- no complaints  -SR    Patient Observations agree to therapy;alert  -SL agree to therapy  -SL alert;cooperative;agree to therapy  -CB alert;cooperative;agree to therapy  -CB alert;cooperative;agree to therapy  -SR    Patient/Family/Caregiver Comments/Observations nurse aware- BP was WFL; reproted pt could attend tx  -SL needs encouragement to participate  -SL -- -- --    Patient Effort -- fair  -SL good  -CB adequate  -CB good  -SR    Comment -- -- -- Pt reports \"I come in here and feel stupid\"  -CB --    Symptoms Noted During/After Treatment none  -SL none  -SL -- -- none  -SR       " General Information    Patient Profile Reviewed -- -- yes  -CB yes  -CB --       Pain Scale: Numbers Pre/Post-Treatment    Pretreatment Pain Rating -- -- -- -- 0/10 - no pain  -SR    Posttreatment Pain Rating -- -- -- -- 0/10 - no pain  -SR    Row Name 08/01/22 1000                   Communication Assessment/Intervention    Document Type therapy note (daily note)  -SR        Subjective Information no complaints  -SR        Patient Observations alert;cooperative;agree to therapy  -SR        Patient Effort good  -SR        Symptoms Noted During/After Treatment none  -SR                  Pain Scale: Numbers Pre/Post-Treatment    Pretreatment Pain Rating 5/10  -SR        Posttreatment Pain Rating 5/10  -SR        Pain Location - hip  -SR              User Key  (r) = Recorded By, (t) = Taken By, (c) = Cosigned By    Initials Name Effective Dates    SL Roselia Ibrahim, MS CCC-SLP 06/16/21 -     CB Tarsha Clinton, SLP 11/16/21 -     SR Lydia Taylor, SLP 01/12/22 -                    EDUCATION    The patient has been educated in the following areas:       Cognitive Impairment.             SLP GOALS     Row Name 08/03/22 1348 08/03/22 0943 08/02/22 1330       Memory Skills Goal 1 (SLP)    Progress (Memory Skills Goal 1, SLP) -- -- 80%;with minimal cues (75-90%)  -CB    Progress/Outcomes (Memory Skills Goal 1, SLP) goal ongoing  -SL goal ongoing  -SL goal ongoing  -CB    Comment (Memory Skills Goal 1, SLP) recall of detials from picture scenes- 62% indep  - immed visual  recall- 4 words- spatial components- 25% for recall of sptial components; 42% for recall of words ( without spatial restrictions)  - associative word lists  -CB       Reasoning Goal 1 (SLP)    Progress/Outcomes (Reasoning Goal 1, SLP) goal ongoing  -SL -- --    Comment (Reasoning Goal 1, SLP) deduction by exclusion calendar task- 71% with min cues for details and attn  -SL -- --       Functional Problem Solving Skills Goal 1 (SLP)    Barriers (Problem  Solving Goal 1, SLP) -- slow to process info and analyze infromation  -SL --    Progress/Outcomes (Problem Solving Goal 1, SLP) -- goal ongoing  -SL --    Comment (Problem Solving Goal 1, SLP) -- answering ?'s re; pool/water park based on chart- 33% indep;  67% with min cues  -SL --       Functional Math Skills Goal 1 (SLP)    Progress/Outcomes (Functional Math Skills Goal 1, SLP) goal ongoing;continuing progress toward goal  -SL -- --    Comment (Functional Math Skills Goal 1, SLP) adding denomination amts- 100% indep  -SL -- --    Row Name 08/02/22 0930 08/01/22 1400 08/01/22 1000       Attention Goal 1 (SLP)    Improve Attention by Goal 1 (SLP) -- complete divided attention task;complete selective attention task;90%;independently (over 90% accuracy)  -SR --    Time Frame (Attention Goal 1, SLP) -- by discharge  -SR --    Progress/Outcomes (Attention Goal 1, SLP) -- goal ongoing  -SR --    Comment (Attention Goal 1, SLP) -- Four component written directions; 80% acc given min cues.  -SR --       Memory Skills Goal 1 (SLP)    Improve Memory Skills Through Goal 1 (SLP) -- recall details of the day;use memory strategies;use written schedule;listen to a paragraph and answer questions;read a paragraph and answer questions;visual memory task;select a word from a list by exclusion;repeat list in sequential order;recall details from a word list;recalling unrelated word lists with an imposed delay;recalling related word lists with an imposed delay;90%  -SR --    Time Frame (Memory Skills Goal 1, SLP) -- by discharge  -SR --    Progress/Outcomes (Memory Skills Goal 1, SLP) -- goal ongoing  -SR --    Comment (Memory Skills Goal 1, SLP) -- Immediate recall of 5 words; 30% acc independently; 90% acc given min cues  -SR --       Reasoning Goal 1 (SLP)    Improve Reasoning Through Goal 1 (SLP) -- complete basic reasoning task;complete high level reasoning task;complete deductive reasoning task;complete mental flexibility  task;complete logic/creative thinking task;90%;independently (over 90% accuracy)  -SR --    Time Frame (Reasoning Goal 1, SLP) -- by discharge  -SR --    Progress/Outcomes (Reasoning Goal 1, SLP) -- goal ongoing  -SR --    Comment (Reasoning Goal 1, SLP) -- Identified incongruities on restaurant menu with 70% acc independently; 90% acc given min cues. Pt benefitted from verbal cues for attention to detail.  -SR --       Functional Problem Solving Skills Goal 1 (SLP)    Improve Problem Solving Through Goal 1 (SLP) -- -- complete organization/home management task;sequence steps in a task;determine solutions to multifactorial problems;90%;independently (over 90% accuracy)  -SR    Time Frame (Problem Solving Goal 1, SLP) -- -- by discharge  -SR    Progress/Outcomes (Problem Solving Goal 1, SLP) -- -- goal ongoing  -SR    Comment (Problem Solving Goal 1, SLP) -- -- Labeled steps in sequential order with 75% acc independently  -SR       Executive Functional Skills Goal 1 (SLP)    Improve Executive Function Skills Goal 1 (SLP) -- -- perform self-evaluation;perform self-correction;exhibit cognitive flexibility;home management activity;realistic goal setting;complex organization/planning activity;time management activity;organization/planning activity;90%;independently (over 90% accuracy)  -SR    Time Frame (Executive Function Skills Goal 1, SLP) -- -- by discharge  -SR    Progress/Outcomes (Executive Function Skills Goal 1, SLP) -- -- goal ongoing  -SR    Comment (Executive Function Skills Goal 1, SLP) -- -- Medication management; Pt sorted 10 mock medications using pill organizer (AM and PM sections) given verbal instructions with 60% acc independently; 90% acc given min cues.  Cues were provided for placement. Pt often added extra medication to a section; showed increased awareness given verbal cues.  -SR       Additional Goal 1 (SLP)    Additional Goal 1, SLP Pt will decrease vocal fatigue w/use of vocal exercises  -CB  -- --    Time Frame (Additional Goal 1, SLP) by discharge  -CB -- --    Progress/Outcomes (Additional Goal 1, SLP) good progress toward goal  -CB -- --          User Key  (r) = Recorded By, (t) = Taken By, (c) = Cosigned By    Initials Name Provider Type    Roselia Corbett MS CCC-SLP Speech and Language Pathologist    Tarsha Elmore, SLP Speech and Language Pathologist    SR Lydia Taylor, SLP Speech and Language Pathologist                            Time Calculation:        Time Calculation- SLP     Row Name 08/03/22 1400 08/03/22 0959          Time Calculation- SLP    SLP Start Time 1330  -SL 0930  -SL     SLP Stop Time 1400  -SL 1000  -SL     SLP Time Calculation (min) 30 min  -SL 30 min  -SL           User Key  (r) = Recorded By, (t) = Taken By, (c) = Cosigned By    Initials Name Provider Type    Roselia Corbett MS CCC-SLP Speech and Language Pathologist                  Therapy Charges for Today     Code Description Service Date Service Provider Modifiers Qty    83168152860 HC ST DEV OF COGN SKILLS INITIAL 15 MIN 8/3/2022 Roselia Ibrahim MS CCC-SLP  1    86168264311 HC ST DEV OF COGN SKILLS EACH ADDT'L 15 MIN 8/3/2022 Roselia Ibrahim MS CCC-SLP  1    42559950488 HC ST DEV OF COGN SKILLS EACH ADDT'L 15 MIN 8/3/2022 Roselia Ibrahim MS CCC-SLP  2                           Roselia Ibrahim MS CCC-SLP  8/3/2022

## 2022-08-03 NOTE — PLAN OF CARE
Goal Outcome Evaluation:           Progress: improving  Outcome Evaluation: Stroke. L. robel. L. facial droop. HR runs faustino. Continent B&B. Last BM 7/29. Given bowel meds. Hx. falls. Fell here. Pain to L. hand. Edema to pointer finger on L. hand. Pain has improved. Swelling has lessened. Applied voltaren gel. Given Norco and ice with some relief. Labs: M/Th: CBC and BMP. Prefers the bedroom door to remain closed at all times and keep the lights off. Meds whole with water. Participated in therapy and rested in the bed. Diet: Reg, thins. Did not eat much at meals. Assistx1 with wheelchair. CTscan of L. hip completed. D/C home Friday, 8/5. Family conference completed today. A&OX4. Forgetful.

## 2022-08-03 NOTE — THERAPY TREATMENT NOTE
"Inpatient Rehabilitation - Physical Therapy Treatment Note       [facility]     Patient Name: Shiela Turk Jr.  : 1945  MRN: [id number]    Today's Date: 8/3/2022                    Admit Date: 2022      Visit Dx:     ICD-10-CM ICD-9-CM   1. Impaired gait and mobility  R26.89 781.2   2. Follow-up exam  Z09 V67.9   3. Right pontine stroke (HCC)  I63.50 434.91       Patient Active Problem List   Diagnosis   • Right pontine stroke (HCC)       Past Medical History:   Diagnosis Date   • Arthritis    • Arthritis of back    • Arthritis of knee    • BPH (benign prostatic hyperplasia)    • Cancer (HCC)    • Cancer of kidney (HCC)    • COVID    • Rheumatic fever    • Stroke (HCC)        Past Surgical History:   Procedure Laterality Date   • CYST REMOVAL     • NEPHRECTOMY     • SOFT TISSUE BIOPSY         PT ASSESSMENT (last 12 hours)     IRF PT Evaluation and Treatment     Row Name 22          PT Time and Intention    Document Type daily treatment  -LB     Mode of Treatment physical therapy  -LB     Patient/Family/Caregiver Comments/Observations Supine in bed. NAD  -LB     Row Name 22          General Information    Existing Precautions/Restrictions fall  -LB     Comment, General Information In PM states that his head feels \"fuzzy\". He was unable to elaborate. BP taken:105/78. Nurse notified.  -LB     Row Name 22          Pain Assessment    Pretreatment Pain Rating 5/10  \"4 or 5\"  -LB     Pain Location - Side/Orientation Left  -LB     Pain Location - hip  -LB     Pre/Posttreatment Pain Comment premedicated. Amb. Reports that amb makes his hip feel better.  -LB     Row Name 22          Bed Mobility    Supine-Sit [place] (Bed Mobility) modified independence  -LB     Assistive Device (Bed Mobility) bed rails;head of bed elevated  -LB     Row Name 22          Transfers    Bed-Chair [place] (Transfers) contact guard;standby assist  -LB     Sit-Stand " [place] (Transfers) standby assist  -LB     Stand-Sit [place] (Transfers) standby assist  -LB     Row Name 08/03/22 0910          Sit-Stand Transfer    Assistive Device (Sit-Stand Transfers) walker, front-wheeled;wheelchair  -LB     Row Name 08/03/22 0910          Stand-Sit Transfer    Assistive Device (Stand-Sit Transfers) walker, front-wheeled;wheelchair  -LB     Row Name 08/03/22 0910          Car Transfer    Type (Car Transfer) sit-stand;stand-sit  -LB     [place] Level (Car Transfer) standby assist;verbal cues  -LB     Assistive Device (Car Transfer) walker, front-wheeled  -LB     Row Name 08/03/22 0910          Gait/Stairs (Locomotion)    [place] Level (Gait) standby assist;verbal cues  occas cues to slow down  -LB     Assistive Device (Gait) walker, front-wheeled  -LB     Distance in Feet (Gait) 300  -LB     Pattern (Gait) step-through  -LB     Deviations/Abnormal Patterns (Gait) base of support, narrow;antalgic;stride length decreased  -LB     Bilateral Gait Deviations forward flexed posture  -LB     Left Sided Gait Deviations decreased knee extension;heel strike decreased  -LB     [place] Level (Stairs) contact guard  -LB     Handrail Location (Stairs) left side (ascending);right side (descending)  -LB     Number of Steps (Stairs) 8  -LB     Ascending Technique (Stairs) step-to-step  -LB     Descending Technique (Stairs) step-to-step  -LB     Row Name 08/03/22 0910          Curb Negotiation (Mobility)    [place], Curb Negotiation contact guard;verbal cues  -LB     Assistive Device (Curb Negotiation) walker, front-wheeled  -LB     Row Name 08/03/22 0910          Rough/Uneven Surface Gait Skills (Mobility)    [place], Gait on Rough/Uneven Surface (Mobility) contact guard  -LB     Assistive Device (Rough/Uneven Surface Gait) walker, front-wheeled  -LB     Distance in Feet (Rough/Uneven Surface Gait) 12  -LB     Row Name 08/03/22 0910          Balance    Comment, Balance  stepping over objects, w rwx, CGA  -LB     Row Name 08/03/22 0910          Hip (Therapeutic Exercise)    Hip Strengthening (Therapeutic Exercise) bilateral;aBduction;sitting;resistance band;red;15 repititions;flexion  -LB     Row Name 08/03/22 0910          Knee (Therapeutic Exercise)    Knee Strengthening (Therapeutic Exercise) bilateral;flexion;sitting;resistance band;red;15 repititions;LAQ (long arc quad)  -LB     Row Name 08/03/22 0910          Positioning and Restraints    Post Treatment Position wheelchair  -LB     In Wheelchair sitting;exit alarm on;with SLP  -LB           User Key  (r) = Recorded By, (t) = Taken By, (c) = Cosigned By    Initials Name Provider Type    LB Keiry Roth PTA Physical Therapist Assistant                 Physical Therapy Education                 Title: PT OT SLP Therapies (In Progress)     Topic: Physical Therapy (In Progress)     Point: Mobility training (Done)     Learning Progress Summary           Patient Acceptance, E, VU,NR by LB at 8/3/2022 1226    Comment: curb    Acceptance, E, VU,NR by LB at 8/2/2022 1418    Comment: stairs    Acceptance, E, NR by LB at 8/1/2022 1337    Comment: car tsf, stairs    Acceptance, E, NR by LB at 7/30/2022 1213    Comment: stairs    Acceptance, E, NR by LB at 7/29/2022 1453    Acceptance, E,D, NR by KP at 7/28/2022 1513    Acceptance, E, NR by LB at 7/27/2022 0959    Comment: car tsf    Acceptance, E, NR by LB at 7/26/2022 0957    Comment: stairs, curb    Acceptance, E, NR by LB at 7/25/2022 0955    Comment: Amb    Acceptance, E,TB, VU,NR by JORGE LUIS at 7/24/2022 1148    Comment: not to get up on his own, call for assist   Significant Other Acceptance, E,D, VU,DU by LB at 8/3/2022 1256    Comment: car, curb, stairs, amb. (Observed car tsf).                   Point: Home exercise program (In Progress)     Learning Progress Summary           Patient Acceptance, E,D, NR by KP at 7/28/2022 1513                   Point: Body mechanics (Done)      Learning Progress Summary           Significant Other Acceptance, E, VU by LB at 8/3/2022 1256                   Point: Precautions (Done)     Learning Progress Summary           Significant Other Acceptance, E, VU by LB at 8/3/2022 1256                               User Key     Initials Effective Dates Name Provider Type Discipline     06/16/21 -  Kanwal Coleman, PT Physical Therapist PT    LB 03/07/18 -  Keiry Roth PTA Physical Therapist Assistant PT     06/16/21 -  Destiny Steiner, PT Physical Therapist PT                PT Recommendation and Plan  Patient was wearing a face mask during this therapy encounter. Therapist used appropriate personal protective equipment including mask and gloves.  Mask used was standard procedure mask. Appropriate PPE was worn during the entire therapy session. Hand hygiene was completed before and after therapy session. Patient is not in enhanced droplet precautions.                           Time Calculation:      PT Charges     Row Name 08/03/22 1415 08/03/22 0943          Time Calculation    Start Time 1240  -LB 0900  -LB     Stop Time 1310  -LB 0930  -LB     Time Calculation (min) 30 min  -LB 30 min  -LB           User Key  (r) = Recorded By, (t) = Taken By, (c) = Cosigned By    Initials Name Provider Type    LB Keiry Roth PTA Physical Therapist Assistant                Therapy Charges for Today     Code Description Service Date Service Provider Modifiers Qty    15674395695 HC PT THER PROC EA 15 MIN 8/2/2022 Keiry Roth PTA GP 6    03191281509 HC PT THER PROC EA 15 MIN 8/3/2022 Keiry Roth PTA GP 4                   Keiry Roth PTA  8/3/2022

## 2022-08-04 PROBLEM — M10.9 ACUTE GOUT OF LEFT HAND: Status: ACTIVE | Noted: 2022-08-04

## 2022-08-04 LAB
ALBUMIN SERPL-MCNC: 3.2 G/DL (ref 3.5–5.2)
ALP SERPL-CCNC: 98 U/L (ref 39–117)
ALT SERPL W P-5'-P-CCNC: 62 U/L (ref 1–41)
ANION GAP SERPL CALCULATED.3IONS-SCNC: 6.7 MMOL/L (ref 5–15)
AST SERPL-CCNC: 50 U/L (ref 1–40)
BASOPHILS # BLD AUTO: 0.07 10*3/MM3 (ref 0–0.2)
BASOPHILS NFR BLD AUTO: 1.6 % (ref 0–1.5)
BILIRUB CONJ SERPL-MCNC: <0.2 MG/DL (ref 0–0.3)
BILIRUB INDIRECT SERPL-MCNC: ABNORMAL MG/DL
BILIRUB SERPL-MCNC: 0.3 MG/DL (ref 0–1.2)
BUN SERPL-MCNC: 15 MG/DL (ref 8–23)
BUN/CREAT SERPL: 16.5 (ref 7–25)
CALCIUM SPEC-SCNC: 9.3 MG/DL (ref 8.6–10.5)
CHLORIDE SERPL-SCNC: 104 MMOL/L (ref 98–107)
CO2 SERPL-SCNC: 26.3 MMOL/L (ref 22–29)
CREAT SERPL-MCNC: 0.91 MG/DL (ref 0.76–1.27)
DEPRECATED RDW RBC AUTO: 40.7 FL (ref 37–54)
EGFRCR SERPLBLD CKD-EPI 2021: 86.8 ML/MIN/1.73
EOSINOPHIL # BLD AUTO: 0.16 10*3/MM3 (ref 0–0.4)
EOSINOPHIL NFR BLD AUTO: 3.6 % (ref 0.3–6.2)
ERYTHROCYTE [DISTWIDTH] IN BLOOD BY AUTOMATED COUNT: 13 % (ref 12.3–15.4)
GLUCOSE SERPL-MCNC: 82 MG/DL (ref 65–99)
HCT VFR BLD AUTO: 43.9 % (ref 37.5–51)
HGB BLD-MCNC: 15 G/DL (ref 13–17.7)
IMM GRANULOCYTES # BLD AUTO: 0 10*3/MM3 (ref 0–0.05)
IMM GRANULOCYTES NFR BLD AUTO: 0 % (ref 0–0.5)
LYMPHOCYTES # BLD AUTO: 2.28 10*3/MM3 (ref 0.7–3.1)
LYMPHOCYTES NFR BLD AUTO: 51.5 % (ref 19.6–45.3)
MCH RBC QN AUTO: 29.5 PG (ref 26.6–33)
MCHC RBC AUTO-ENTMCNC: 34.2 G/DL (ref 31.5–35.7)
MCV RBC AUTO: 86.4 FL (ref 79–97)
MONOCYTES # BLD AUTO: 0.61 10*3/MM3 (ref 0.1–0.9)
MONOCYTES NFR BLD AUTO: 13.8 % (ref 5–12)
NEUTROPHILS NFR BLD AUTO: 1.31 10*3/MM3 (ref 1.7–7)
NEUTROPHILS NFR BLD AUTO: 29.5 % (ref 42.7–76)
NRBC BLD AUTO-RTO: 0 /100 WBC (ref 0–0.2)
PLATELET # BLD AUTO: 319 10*3/MM3 (ref 140–450)
PMV BLD AUTO: 9.2 FL (ref 6–12)
POTASSIUM SERPL-SCNC: 4 MMOL/L (ref 3.5–5.2)
PROT SERPL-MCNC: 6.2 G/DL (ref 6–8.5)
RBC # BLD AUTO: 5.08 10*6/MM3 (ref 4.14–5.8)
SODIUM SERPL-SCNC: 137 MMOL/L (ref 136–145)
WBC NRBC COR # BLD: 4.43 10*3/MM3 (ref 3.4–10.8)

## 2022-08-04 PROCEDURE — 94761 N-INVAS EAR/PLS OXIMETRY MLT: CPT

## 2022-08-04 PROCEDURE — 94664 DEMO&/EVAL PT USE INHALER: CPT

## 2022-08-04 PROCEDURE — 97110 THERAPEUTIC EXERCISES: CPT

## 2022-08-04 PROCEDURE — 94799 UNLISTED PULMONARY SVC/PX: CPT

## 2022-08-04 PROCEDURE — 85025 COMPLETE CBC W/AUTO DIFF WBC: CPT | Performed by: PHYSICAL MEDICINE & REHABILITATION

## 2022-08-04 PROCEDURE — 97130 THER IVNTJ EA ADDL 15 MIN: CPT

## 2022-08-04 PROCEDURE — 80048 BASIC METABOLIC PNL TOTAL CA: CPT | Performed by: PHYSICAL MEDICINE & REHABILITATION

## 2022-08-04 PROCEDURE — 97112 NEUROMUSCULAR REEDUCATION: CPT

## 2022-08-04 PROCEDURE — 80076 HEPATIC FUNCTION PANEL: CPT | Performed by: PHYSICAL MEDICINE & REHABILITATION

## 2022-08-04 PROCEDURE — 97129 THER IVNTJ 1ST 15 MIN: CPT

## 2022-08-04 PROCEDURE — 94760 N-INVAS EAR/PLS OXIMETRY 1: CPT

## 2022-08-04 PROCEDURE — 25010000002 ENOXAPARIN PER 10 MG: Performed by: PHYSICAL MEDICINE & REHABILITATION

## 2022-08-04 RX ORDER — PSEUDOEPHEDRINE HCL 30 MG
100 TABLET ORAL 2 TIMES DAILY
Qty: 60 CAPSULE | Refills: 1 | Status: SHIPPED | OUTPATIENT
Start: 2022-08-04

## 2022-08-04 RX ORDER — ASPIRIN 81 MG/1
81 TABLET ORAL DAILY
Qty: 90 TABLET | Refills: 1 | Status: SHIPPED | OUTPATIENT
Start: 2022-08-05

## 2022-08-04 RX ORDER — COLCHICINE 0.6 MG/1
TABLET ORAL
Qty: 5 TABLET | Refills: 0 | Status: SHIPPED | OUTPATIENT
Start: 2022-08-04

## 2022-08-04 RX ORDER — AMOXICILLIN 250 MG
2 CAPSULE ORAL DAILY
Qty: 60 TABLET | Refills: 1 | Status: SHIPPED | OUTPATIENT
Start: 2022-08-05

## 2022-08-04 RX ORDER — ATORVASTATIN CALCIUM 80 MG/1
80 TABLET, FILM COATED ORAL NIGHTLY
Qty: 30 TABLET | Refills: 1 | Status: SHIPPED | OUTPATIENT
Start: 2022-08-04

## 2022-08-04 RX ORDER — BUDESONIDE AND FORMOTEROL FUMARATE DIHYDRATE 160; 4.5 UG/1; UG/1
2 AEROSOL RESPIRATORY (INHALATION)
Qty: 10.2 G | Refills: 1 | Status: SHIPPED | OUTPATIENT
Start: 2022-08-04

## 2022-08-04 RX ORDER — HYDROCODONE BITARTRATE AND ACETAMINOPHEN 5; 325 MG/1; MG/1
1 TABLET ORAL EVERY 4 HOURS PRN
Qty: 8 TABLET | Refills: 0 | Status: SHIPPED | OUTPATIENT
Start: 2022-08-04 | End: 2022-08-05

## 2022-08-04 RX ORDER — ERGOCALCIFEROL 1.25 MG/1
50000 CAPSULE ORAL
Qty: 6 CAPSULE | Refills: 0 | Status: SHIPPED | OUTPATIENT
Start: 2022-08-08 | End: 2022-09-16

## 2022-08-04 RX ADMIN — ASPIRIN 81 MG: 81 TABLET, COATED ORAL at 08:11

## 2022-08-04 RX ADMIN — ATORVASTATIN CALCIUM 80 MG: 80 TABLET, FILM COATED ORAL at 20:18

## 2022-08-04 RX ADMIN — DOCUSATE SODIUM 50MG AND SENNOSIDES 8.6MG 2 TABLET: 8.6; 5 TABLET, FILM COATED ORAL at 08:11

## 2022-08-04 RX ADMIN — DOCUSATE SODIUM 100 MG: 100 CAPSULE, LIQUID FILLED ORAL at 20:18

## 2022-08-04 RX ADMIN — HYDROCODONE BITARTRATE AND ACETAMINOPHEN 1 TABLET: 5; 325 TABLET ORAL at 01:27

## 2022-08-04 RX ADMIN — DICLOFENAC SODIUM 2 G: 10 GEL TOPICAL at 08:12

## 2022-08-04 RX ADMIN — DICLOFENAC SODIUM 2 G: 10 GEL TOPICAL at 18:45

## 2022-08-04 RX ADMIN — DICLOFENAC SODIUM 2 G: 10 GEL TOPICAL at 20:20

## 2022-08-04 RX ADMIN — BUDESONIDE AND FORMOTEROL FUMARATE DIHYDRATE 2 PUFF: 160; 4.5 AEROSOL RESPIRATORY (INHALATION) at 07:27

## 2022-08-04 RX ADMIN — TAMSULOSIN HYDROCHLORIDE 0.4 MG: 0.4 CAPSULE ORAL at 08:11

## 2022-08-04 RX ADMIN — ENOXAPARIN SODIUM 40 MG: 100 INJECTION SUBCUTANEOUS at 20:18

## 2022-08-04 RX ADMIN — DICLOFENAC SODIUM 2 G: 10 GEL TOPICAL at 12:08

## 2022-08-04 RX ADMIN — DOCUSATE SODIUM 100 MG: 100 CAPSULE, LIQUID FILLED ORAL at 08:11

## 2022-08-04 RX ADMIN — BUDESONIDE AND FORMOTEROL FUMARATE DIHYDRATE 2 PUFF: 160; 4.5 AEROSOL RESPIRATORY (INHALATION) at 21:00

## 2022-08-04 NOTE — PLAN OF CARE
Goal Outcome Evaluation:  Plan of Care Reviewed With: patient           Outcome Evaluation: Patient contijue on rehabilitation due to diagnosis of Stroke, some defect to left extremities but able to tolerate activities well, continue to complain of left arm  and hip pain this shift,  pain medication, Voltran gel given and effective, able to make needs known alert and oriented X4,continue to keep room dark  regular diet with thin liquids, called for time on patients CT Scan, but they indicates they will be here this morning

## 2022-08-04 NOTE — THERAPY DISCHARGE NOTE
Inpatient Rehabilitation - Speech Language Pathology Discharge Summary  [facility]       Patient Name: Shiela Turk Jr.  : 1945  MRN: [id number]    Today's Date: 2022                   Admit Date: 2022      SLP Recommendation and Plan    The patient has made some progress in therapy, but continues to display deficits in the areas of attention, working memory, delayed recall, safety awareness, insight/judgement, reasoning, math and executive functioning skills.  It is recommended that the patient receive supervision at home, especially in areas related to medication management, transfers, financial issues and that therapy continue at the next level of care.     Visit Dx:    ICD-10-CM ICD-9-CM   1. Impaired gait and mobility  R26.89 781.2   2. Follow-up exam  Z09 V67.9   3. Right pontine stroke (HCC)  I63.50 434.91          Time Calculation- SLP     Row Name 22 1359 22 1028          Time Calculation- SLP    SLP Start Time 1330  -SL 1000  -SL     SLP Stop Time 1400  -SL 1030  -SL     SLP Time Calculation (min) 30 min  -SL 30 min  -SL           User Key  (r) = Recorded By, (t) = Taken By, (c) = Cosigned By    Initials Name Provider Type    Roselia Corbett MS CCC-SLP Speech and Language Pathologist                   SLP GOALS     Row Name 22 1332 22 1000 22 1348       Attention Goal 1 (SLP)    Progress/Outcomes (Attention Goal 1, SLP) continuing progress toward goal;goal ongoing  -SL -- --    Comment (Attention Goal 1, SLP) L/R chart with varying numbered blanks- following specific directives- 80% indep  -SL -- --       Memory Skills Goal 1 (SLP)    Progress/Outcomes (Memory Skills Goal 1, SLP) continuing progress toward goal;goal ongoing  -SL goal ongoing  -SL goal ongoing  -SL    Comment (Memory Skills Goal 1, SLP) immediate recall- 10 related word pairs- characteristic coding- 90% indep  -SL 24 item hidden picture matching task- 30%;  20 item matching task- 85%;  3  step 3s tep verbal directives with picute manipulation- 71% with occasional repetition of instructions  -SL recall of detials from picture scenes- 62% indep  -SL       Reasoning Goal 1 (SLP)    Progress/Outcomes (Reasoning Goal 1, SLP) -- -- goal ongoing  -SL    Comment (Reasoning Goal 1, SLP) -- -- deduction by exclusion calendar task- 71% with min cues for details and attn  -SL       Functional Problem Solving Skills Goal 1 (SLP)    Barriers (Problem Solving Goal 1, SLP) impaired safety awareness, judgement, memory, insight- noted to get out of bed within 5 min of instructing pt not to get up without calling for assistance-  -SL -- --    Progress/Outcomes (Problem Solving Goal 1, SLP) goal ongoing  -SL -- --    Comment (Problem Solving Goal 1, SLP) category matrix- initial letter restrictions-75% indep  -SL -- --       Functional Math Skills Goal 1 (SLP)    Progress/Outcomes (Functional Math Skills Goal 1, SLP) -- -- goal ongoing;continuing progress toward goal  -SL    Comment (Functional Math Skills Goal 1, SLP) -- -- adding denomination amts- 100% indep  -SL    Row Name 08/03/22 0943 08/02/22 1330 08/02/22 0930       Memory Skills Goal 1 (SLP)    Progress (Memory Skills Goal 1, SLP) -- 80%;with minimal cues (75-90%)  -CB --    Progress/Outcomes (Memory Skills Goal 1, SLP) goal ongoing  -SL goal ongoing  -CB --    Comment (Memory Skills Goal 1, SLP) immed visual  recall- 4 words- spatial components- 25% for recall of sptial components; 42% for recall of words ( without spatial restrictions)  -SL associative word lists  -CB --       Functional Problem Solving Skills Goal 1 (SLP)    Barriers (Problem Solving Goal 1, SLP) slow to process info and analyze infromation  -SL -- --    Progress/Outcomes (Problem Solving Goal 1, SLP) goal ongoing  -SL -- --    Comment (Problem Solving Goal 1, SLP) answering ?'s re; pool/water park based on chart- 33% indep;  67% with min cues  -SL -- --       Additional Goal 1 (SLP)     Additional Goal 1, SLP -- -- Pt will decrease vocal fatigue w/use of vocal exercises  -CB    Time Frame (Additional Goal 1, SLP) -- -- by discharge  -CB    Progress/Outcomes (Additional Goal 1, SLP) -- -- good progress toward goal  -CB          User Key  (r) = Recorded By, (t) = Taken By, (c) = Cosigned By    Initials Name Provider Type    Roselia Corbett MS CCC-SLP Speech and Language Pathologist    Tarsha Elmore SLP Speech and Language Pathologist                  Therapy Charges for Today     Code Description Service Date Service Provider Modifiers Qty    61896946407 HC ST DEV OF COGN SKILLS INITIAL 15 MIN 8/3/2022 Roselia Ibrahim MS CCC-SLP  1    51914742853 HC ST DEV OF COGN SKILLS EACH ADDT'L 15 MIN 8/3/2022 Roselia Ibrahim MS CCC-SLP  1    35540208682 HC ST DEV OF COGN SKILLS EACH ADDT'L 15 MIN 8/3/2022 Roselia Ibrahim MS CCC-SLP  2    09860434672 HC ST DEV OF COGN SKILLS INITIAL 15 MIN 8/4/2022 Roselia Ibrahim MS CCC-SLP  1    63928909843 HC ST DEV OF COGN SKILLS EACH ADDT'L 15 MIN 8/4/2022 Roselia Ibrahim MS CCC-SLP  1    [id number] HC ST DEV OF COGN SKILLS EACH ADDT'L 15 MIN 8/4/2022 Roselia Ibrahim MS CCC-SLP  2            SLP Discharge Summary  Anticipated Discharge Disposition (SLP): home with assist      Roselia Ibrahim MS CCC-HEIDI  8/4/2022

## 2022-08-04 NOTE — THERAPY DISCHARGE NOTE
Inpatient Rehabilitation - IRF Occupational Therapy Treatment Note/Discharge  [facility]     Patient Name: Shiela Turk Jr.  : 1945  MRN: [id number]  Today's Date: 2022               Admit Date: 2022       ICD-10-CM ICD-9-CM   1. Impaired gait and mobility  R26.89 781.2   2. Follow-up exam  Z09 V67.9   3. Right pontine stroke (HCC)  I63.50 434.91     Patient Active Problem List   Diagnosis   • Right pontine stroke (HCC)     Past Medical History:   Diagnosis Date   • Arthritis    • Arthritis of back    • Arthritis of knee    • BPH (benign prostatic hyperplasia)    • Cancer (HCC)    • Cancer of kidney (HCC)    • COVID    • Rheumatic fever    • Stroke (HCC)      Past Surgical History:   Procedure Laterality Date   • CYST REMOVAL     • NEPHRECTOMY     • SOFT TISSUE BIOPSY         IRF OT ASSESSMENT FLOWSHEET (last 12 hours)     IRF OT Evaluation and Treatment     Row Name 22 1619          OT Time and Intention    Document Type daily treatment;discharge evaluation  -CC     Mode of Treatment occupational therapy  -CC     Patient Effort good  -CC     Symptoms Noted During/After Treatment none  -CC     Row Name 22 1619          Pain Assessment    Pretreatment Pain Rating 0/10 - no pain  -CC     Posttreatment Pain Rating 0/10 - no pain  -CC     Row Name 22 1619          Cognition/Psychosocial    Affect/Mental Status (Cognition) WFL  -CC     Orientation Status (Cognition) oriented x 3  -CC     Follows Commands (Cognition) follows one-step commands  -CC     Personal Safety Interventions fall prevention program maintained;gait belt;nonskid shoes/slippers when out of bed  -CC     Row Name 22 1619          Range of Motion (ROM)    Range of Motion ROM is WFL  -CC     Row Name 22 1619          Range of Motion Comprehensive    General Range of Motion bilateral upper extremity ROM WFL  -CC     Row Name 22 1619          General Upper Extremity Assessment (Range of Motion)     Upper Extremity: Range of Motion RUE ROM L  -     Row Name 08/04/22 1619          Left Shoulder, Range of Motion    Left Shoulder, Range of Motion ROM in all planes is [facility]  -     Row Name 08/04/22 1619          Strength (Manual Muscle Testing)    Strength (Manual Muscle Testing) strength is WFL;bilateral upper extremities  -     Left Hand, Setting 1 (Dynamometer Testing) 20  -CC     Right Hand, Setting 1 (Dynamometer Testing) 8+6  -CC     Left Hand: Lateral (Key) Pinch Strength (Pinch Dynamometer Testing) 9  -CC     Left Hand: [place] Pinch Strength (Pinch Dynamometer Testing) 6  -CC     Right Hand: Lateral (Key) Pinch Strength (Pinch Dynamometer Testing) 27  -CC     Right Hand: [place] ([place]) Pinch Strength (Pinch Dynamometer Testing) 18  -     Row Name 08/04/22 1619          Strength Comprehensive (MMT)    General Manual Muscle Testing (MMT) Assessment hand strength deficits identified  -     Hand Strength Assessment hand  strength;hand strength (manual muscle testing)  -     Row Name 08/04/22 1619          Vision Assessment/Intervention    Visual Impairment/Limitations WFL  -     Row Name 08/04/22 1619          Sensory Assessment (Somatosensory)    Sensory Assessment (Somatosensory) UE sensation intact  -     Row Name 08/04/22 1619          Bathing    [place] Level (Bathing) bathing skills;lower body;upper body;supervision  -     Assistive Device (Bathing) grab bar/tub rail;hand held shower spray hose;shower chair  -     Position (Bathing) supported sitting;supported standing  -     Set-up Assistance (Bathing) adjust water temperature;obtain supplies  -     Row Name 08/04/22 1619          Upper Body Dressing    [place] Level (Upper Body Dressing) upper body dressing skills;doff;don;pull over garment;set up assistance  -     Position (Upper Body Dressing) supported sitting  -     Set-up Assistance (Upper Body Dressing) obtain clothing  -     Row Name  08/04/22 1619          Lower Body Dressing    [place] Level (Lower Body Dressing) doff;don;pants/bottoms;shoes/slippers;socks;underwear;supervision;standby assist  -     Position (Lower Body Dressing) supported sitting  -     Set-up Assistance (Lower Body Dressing) obtain clothing  -     Row Name 08/04/22 1619          Grooming    [place] Level (Grooming) grooming skills;deodorant application;oral care regimen;wash face, hands;set up  -     Position (Grooming) supported sitting  -     Set-up Assistance (Grooming) obtain supplies  -[facility] Name 08/04/22 1619          Toileting    [place] Level (Toileting) toileting skills;supervision  -     Position (Toileting) supported sitting;supported standing  -[facility] Name 08/04/22 1619          Bed Mobility    Rolling Left [place] (Bed Mobility) independent  -     Rolling Right [place] (Bed Mobility) independent  -     Supine-Sit [place] (Bed Mobility) independent  -     Sit-Supine [place] (Bed Mobility) independent  -[facility] Name 08/04/22 1619          Functional Mobility    Functional Mobility- Ind. Level supervision required  -     Functional Mobility- Device walker, front-wheeled  -     Functional Mobility-Distance (Feet) --  in room  -[facility] Name 08/04/22 1619          Transfers    Sit-Stand [place] (Transfers) supervision  -     Stand-Sit [place] (Transfers) supervision  -     [place] Level (Toilet Transfer) supervision  -     Assistive Device (Toilet Transfer) walker, front-wheeled  -     [place] Level (Shower Transfer) supervision  -     Assistive Device (Shower Transfer) shower chair;walker, front-wheeled  -     Row Name 08/04/22 1619          Sit-Stand Transfer    Assistive Device (Sit-Stand Transfers) walker, front-wheeled;wheelchair  -[facility] Name 08/04/22 1619          Stand-Sit Transfer    Assistive Device (Stand-Sit Transfers) walker, front-wheeled;wheelchair   -[facility] Name 08/04/22 1619          Toilet Transfer    Type (Toilet Transfer) sit-stand;stand-sit  -[facility] Name 08/04/22 1619          Shower Transfer    Type (Shower Transfer) sit-stand;stand-sit  -[facility] Name 08/04/22 1619          Motor Skills    Coordination fine motor deficit;left;upper extremity  in hand manipulation w retrieving buttons from palm pf hand. Moderate difficulty. Mod difficulty w picking up dried beansalternating finger to thumb. Unable to  w 4th and 5th digit  -     Results, 9 Hole Peg Test of Fine Motor Coordination RUE 28 LUE 45 Box blocks RUE 49 LUE 33  -     Functional Endurance good  -[facility] Name 08/04/22 1619          Elbow/Forearm (Therapeutic Exercise)    Elbow/Forearm (Therapeutic Exercise) strengthening exercise  -     Elbow/Forearm Strengthening (Therapeutic Exercise) flexion;extension;supination;pronation;sitting;left;3 lb free weight;10 repetitions  -[facility] Name 08/04/22 1619          Wrist (Therapeutic Exercise)    Wrist (Therapeutic Exercise) strengthening exercise  -     Wrist Strengthening (Therapeutic Exercise) left;flexion;extension;3 lb free weight;10 repetitions  -[facility] Name 08/04/22 1619          Aerobic Exercise    Type (Aerobic Exercise) arm bike;for 5 minutes  -     Time Performed (Aerobic Exercise) seated  -[facility] Name 08/04/22 1619          Balance    Static Sitting Balance independent  -     Static Standing Balance supervision  -CC     Row Name 08/04/22 1619          Positioning and Restraints    Pre-Treatment Position in bed  -     Post Treatment Position wheelchair  -     In Wheelchair sitting;with SLP;exit alarm on  -[facility] Name 08/04/22 1619          Transfer Goal 2 (OT-IRF)    Progress/Outcomes (Transfer Goal 2, OT-IRF) goal met  -[facility] Name 08/04/22 1619          Toileting Goal 2 (OT-IRF)    Progress/Outcomes (Toileting Goal 2, OT-IRF) goal met  -[facility] Name 08/04/22 1619          ROM Goal 1  (OT-IRF)    Progress/Outcomes (ROM Goal 1, OT-IRF) goal met  -CC     Row Name 08/04/22 1619          Strength Goal 1 (OT-IRF)    Progress/Outcomes (Strength Goal 1, OT-IRF) goal met  -CC     Row Name 08/04/22 1619          Balance Goal 2 (OT)    Progress/Outcome (Balance Goal 2, OT) goal met  -CC     Row Name 08/04/22 1619          Coordination Goal 1 (OT)    Progress/Outcomes (Coordination Goal 1, OT) goal met  -CC     Row Name 08/04/22 1619          Coordination Goal 2 (OT)    Progress/Outcomes (Coordination Goal 2, OT) goal met  -CC     Row Name 08/04/22 1619          Caregiver Training Goal 1 (OT-IRF)    Progress/Outcomes (Caregiver Training Goal 1, OT-IRF) goal met  -     Row Name 08/04/22 1619          Discharge Summary (OT)    Outcomes Achieved Upon Discharge (OT) all goals met within established timeframes  -     Discharge Summary Statement (OT) Pt D/C home w spouse w HEP and outpt services. Pt at SBA level.  -           User Key  (r) = Recorded By, (t) = Taken By, (c) = Cosigned By    Initials Name Effective Dates    CC Phoebe Healy, OTR 06/16/21 -                    Occupational Therapy Education                 Title: PT OT SLP Therapies (Done)     Topic: Occupational Therapy (Resolved)     Point: ADL training (Resolved)     Description:   Instruct learner(s) on proper safety adaptation and remediation techniques during self care or transfers.   Instruct in proper use of assistive devices.              Learning Progress Summary           Patient Acceptance, E, VU by CC at 8/3/2022 1517    Comment: Family conf with pt and spouse. Status, DME, followup therapy and home needs discussed. Pt has all needed DME at home    Acceptance, E, VU by CC at 7/25/2022 1243    Comment: Explanation of OT services and evaluation results.   Family Acceptance, E, VU by CC at 8/3/2022 1517    Comment: Family conf with pt and spouse. Status, DME, followup therapy and home needs discussed. Pt has all needed DME at home                    Point: Home exercise program (Resolved)     Description:   Instruct learner(s) on appropriate technique for monitoring, assisting and/or progressing therapeutic exercises/activities.              Learning Progress Summary           Patient Acceptance, E,D,TB,H, VU,DU by CC at 8/4/2022 1618    Comment: UE light strengthening and Coordination HEP    Acceptance, E, VU by CC at 8/3/2022 1517    Comment: Family conf with pt and spouse. Status, DME, followup therapy and home needs discussed. Pt has all needed DME at home    Acceptance, E, VU by CC at 7/25/2022 1243    Comment: Explanation of OT services and evaluation results.   Family Acceptance, E, VU by CC at 8/3/2022 1517    Comment: Family conf with pt and spouse. Status, DME, followup therapy and home needs discussed. Pt has all needed DME at home                   Point: Precautions (Resolved)     Description:   Instruct learner(s) on prescribed precautions during self-care and functional transfers.              Learning Progress Summary           Patient Acceptance, E, VU by CC at 8/3/2022 1517    Comment: Family conf with pt and spouse. Status, DME, followup therapy and home needs discussed. Pt has all needed DME at home    Acceptance, E, VU by CC at 7/25/2022 1243    Comment: Explanation of OT services and evaluation results.   Family Acceptance, E, VU by CC at 8/3/2022 1517    Comment: Family conf with pt and spouse. Status, DME, followup therapy and home needs discussed. Pt has all needed DME at home                   Point: Body mechanics (Resolved)     Description:   Instruct learner(s) on proper positioning and spine alignment during self-care, functional mobility activities and/or exercises.              Learning Progress Summary           Patient Acceptance, E, VU by CC at 8/3/2022 1517    Comment: Family conf with pt and spouse. Status, DME, followup therapy and home needs discussed. Pt has all needed DME at home    Acceptance, E, VU by  CC at 7/25/2022 1243    Comment: Explanation of OT services and evaluation results.   Family Acceptance, E, VU by CC at 8/3/2022 1517    Comment: Family conf with pt and spouse. Status, DME, followup therapy and home needs discussed. Pt has all needed DME at home                               User Key     Initials Effective Dates Name Provider Type Discipline    CC 06/16/21 -  Phoebe Healy OTR Occupational Therapist OT                OT Recommendation and Plan  Anticipated Discharge Disposition (OT): home with assist  Planned Therapy Interventions (OT): activity tolerance training, adaptive equipment training, BADL retraining, cognitive/visual perception retraining, IADL retraining, functional balance retraining, patient/caregiver education/training, ROM/therapeutic exercise, strengthening exercise, transfer/mobility retraining           OT IRF GOALS     Row Name 08/04/22 1619 08/02/22 1424 07/25/22 1250       Transfer Goal 1 (OT-IRF)    Activity/Assistive Device (Transfer Goal 1, OT-IRF) -- -- toilet  -CC    [place] Level (Transfer Goal 1, OT-IRF) -- -- minimum assist (75% or more patient effort);contact guard required  -CC    Time Frame (Transfer Goal 1, OT-IRF) -- -- short-term goal (STG);1 week  -CC    Progress/Outcomes (Transfer Goal 1, OT-IRF) -- goal met  -CC continuing progress toward goal  -CC       Transfer Goal 2 (OT-IRF)    Activity/Assistive Device (Transfer Goal 2, OT-IRF) -- -- toilet;tub;walk-in shower  -CC    [place] Level (Transfer Goal 2, OT-IRF) -- -- supervision required  -CC    Time Frame (Transfer Goal 2, OT-IRF) -- -- long-term goal (LTG);by discharge  -CC    Progress/Outcomes (Transfer Goal 2, OT-IRF) goal met  -CC goal partially met  -CC continuing progress toward goal  -CC       UB Dressing Goal 1 (OT-IRF)    Activity/Device (UB Dressing Goal 1, OT-IRF) -- -- upper body dressing  -CC    [place] (UB Dress Goal 1, OT-IRF) -- -- set-up required  -CC    Time Frame (UB  Dressing Goal 1, OT-IRF) -- -- long-term goal (LTG);by discharge  -CC    Progress/Outcomes (UB Dressing Goal 1, OT-IRF) -- goal met  -CC continuing progress toward goal  -CC       Grooming Goal 1 (OT-IRF)    Activity/Device (Grooming Goal 1, OT-IRF) -- -- grooming skills, all  -CC    [place] (Grooming Goal 1, OT-IRF) -- -- set-up required;modified independence  -CC    Time Frame (Grooming Goal 1, OT-IRF) -- -- long-term goal (LTG);by discharge  -CC    Progress/Outcomes (Grooming Goal 1, OT-IRF) -- goal met  -CC continuing progress toward goal  -CC       Toileting Goal 1 (OT-IRF)    Activity/Device (Toileting Goal 1, OT-IRF) -- -- toileting skills, all  -CC    [place] Level (Toileting Goal 1, OT-IRF) -- -- supervision required;contact guard required  -CC    Progress/Outcomes (Toileting Goal 1, OT-IRF) -- goal met  -CC continuing progress toward goal  -CC       Toileting Goal 2 (OT-IRF)    Activity/Device (Toileting Goal 2, OT-IRF) -- -- toileting skills, all;adjust/manage clothing  -CC    [place] Level (Toileting Goal 2, OT-IRF) -- -- modified independence;supervision required  -CC    Progress/Outcomes (Toileting Goal 2, OT-IRF) goal met  -CC continuing progress toward goal  -CC continuing progress toward goal  -CC    Time Frame (Toileting Goal 2, OT-IRF) -- -- long-term goal (LTG);by discharge  -CC       ROM Goal 1 (OT-IRF)    ROM Goal 1 (OT-IRF) -- -- increase LUE to WFL  -CC    Time Frame (ROM Goal 1, OT-IRF) -- -- long-term goal (LTG);by discharge  -CC    Progress/Outcomes (ROM Goal 1, OT-IRF) goal met  -CC continuing progress toward goal  -CC continuing progress toward goal  -CC       Strength Goal 1 (OT-IRF)    Strength Goal 1 (OT-IRF) -- -- increase LUE 1/2 grade  -CC    Time Frame (Strength Goal 1, OT-IRF) -- -- long-term goal (LTG);by discharge  -CC    Progress/Outcomes (Strength Goal 1, OT-IRF) goal met  -CC continuing progress toward goal  -CC continuing progress toward goal  -CC        Balance Goal 1 (OT)    Activity/Assistive Device (Balance Goal 1, OT) -- -- standing, static  -CC    [place] Level/Cues Needed (Balance Goal 1, OT) -- -- contact guard assist;standby assist  -CC    Time Frame (Balance Goal 1, OT) -- -- short term goal (STG)  -CC    Progress/Outcomes (Balance Goal 1, OT) -- goal met  -CC continuing progress toward goal  -CC       Balance Goal 2 (OT)    Activity/Assistive Device (Balance Goal 2, OT) -- -- standing, dynamic  -CC    [place] Level (Balance Goal 2, OT) -- -- supervision required  -CC    Time Frame (Balance Goal 2, OT) -- -- long term goal (LTG);by discharge  -CC    Progress/Outcome (Balance Goal 2, OT) goal met  -CC continuing progress toward goal  -CC continuing progress toward goal  -CC       Caregiver Training Goal 1 (OT-IRF)    Caregiver Training Goal 1 (OT-IRF) -- -- Pt and family I with HEP and home safety  -CC    Time Frame (Caregiver Training Goal 1, OT-IRF) -- -- long-term goal (LTG);by discharge  -CC    Progress/Outcomes (Caregiver Training Goal 1, OT-IRF) goal met  -CC continuing progress toward goal  -CC continuing progress toward goal  -CC          User Key  (r) = Recorded By, (t) = Taken By, (c) = Cosigned By    Initials Name Provider Type    Phoebe Reed OTR Occupational Therapist                    Time Calculation:    Time Calculation- OT     Row Name 08/04/22 1300 08/04/22 1100          Time Calculation- OT    OT Start Time 1300  -CC 1100  -CC     OT Stop Time 1330  -CC 1130  -CC     OT Time Calculation (min) 30 min  -CC 30 min  -CC           User Key  (r) = Recorded By, (t) = Taken By, (c) = Cosigned By    Initials Name Provider Type    Phoebe Reed OTR Occupational Therapist                Therapy Charges for Today     Code Description Service Date Service Provider Modifiers Qty    41703430544 HC OT THER PROC EA 15 MIN 8/4/2022 Phoebe Healy OTR GO 1    [id number] HC OT NEUROMUSC RE EDUCATION EA 15 MIN 8/4/2022 Niraj  NED Sandhu GO 3               OT Discharge Summary  Anticipated Discharge Disposition (OT): home with assist  Discharge Destination: Home with outpatient services    NED Harman  8/4/2022

## 2022-08-04 NOTE — THERAPY TREATMENT NOTE
Inpatient Rehabilitation - Speech Language Pathology Treatment Note    [facility]     Patient Name: Shiela Turk Jr.  : 1945  MRN: [id number]    Today's Date: 2022                   Admit Date: 2022       Visit Dx:      ICD-10-CM ICD-9-CM   1. Impaired gait and mobility  R26.89 781.2   2. Follow-up exam  Z09 V67.9   3. Right pontine stroke (HCC)  I63.50 434.91       Patient Active Problem List   Diagnosis   • Right pontine stroke (HCC)       Past Medical History:   Diagnosis Date   • Arthritis    • Arthritis of back    • Arthritis of knee    • BPH (benign prostatic hyperplasia)    • Cancer (HCC)    • Cancer of kidney (HCC)    • COVID    • Rheumatic fever    • Stroke (HCC)        Past Surgical History:   Procedure Laterality Date   • CYST REMOVAL     • NEPHRECTOMY     • SOFT TISSUE BIOPSY         SLP Recommendation and Plan                                                   SLP EVALUATION (last 72 hours)     SLP SLC Evaluation     Row Name 22 1330 22 1000 22 1345 22 0943 22 1330       Communication Assessment/Intervention    Document Type therapy note (daily note)  -SL therapy note (daily note)  -SL therapy note (daily note)  -SL therapy note (daily note)  -SL therapy note (daily note)  -CB    Subjective Information no complaints  -SL no complaints  -SL complains of;fatigue  -SL no complaints  -SL no complaints  -CB    Patient Observations alert  -SL alert;cooperative  -SL agree to therapy;alert  -SL agree to therapy  -SL alert;cooperative;agree to therapy  -CB    Patient/Family/Caregiver Comments/Observations note- pt got up form bed after first session and went to door unassisted after just being placed in bed and instructed to call nurse if he needed anything- SLP closed dood as pt requested- but pt got up out of bed anyway to close door further.  Poor recall and safety awareness  -SL -- nurse aware- BP was WFL; reproted pt could attend tx  -SL needs encouragement  "to participate  -SL --    Patient Effort poor  -SL adequate  -SL -- fair  -SL good  -CB    Symptoms Noted During/After Treatment none  -SL none  -SL none  -SL none  -SL --       General Information    Patient Profile Reviewed -- -- -- -- yes  -CB    Row Name 08/02/22 0930 08/01/22 1400                Communication Assessment/Intervention    Document Type therapy note (daily note)  -CB therapy note (daily note)  -SR       Subjective Information -- no complaints  -SR       Patient Observations alert;cooperative;agree to therapy  -CB alert;cooperative;agree to therapy  -SR       Patient Effort adequate  -CB good  -SR       Comment Pt reports \"I come in here and feel stupid\"  -CB --       Symptoms Noted During/After Treatment -- none  -SR                General Information    Patient Profile Reviewed yes  -CB --                Pain Scale: Numbers Pre/Post-Treatment    Pretreatment Pain Rating -- 0/10 - no pain  -SR       Posttreatment Pain Rating -- 0/10 - no pain  -SR             User Key  (r) = Recorded By, (t) = Taken By, (c) = Cosigned By    Initials Name Effective Dates    SL Roselia Ibrahim, MS CCC-SLP 06/16/21 -     CB Tarsha Clinton, SLP 11/16/21 -     SR Lydia Taylor, HEIDI 01/12/22 -                    EDUCATION    The patient has been educated in the following areas:       Cognitive Impairment.             SLP GOALS     Row Name 08/04/22 1332 08/04/22 1000 08/03/22 1348       Attention Goal 1 (SLP)    Progress/Outcomes (Attention Goal 1, SLP) continuing progress toward goal;goal ongoing  -SL -- --    Comment (Attention Goal 1, SLP) L/R chart with varying numbered blanks- following specific directives- 80% indep  -SL -- --       Memory Skills Goal 1 (SLP)    Progress/Outcomes (Memory Skills Goal 1, SLP) continuing progress toward goal;goal ongoing  -SL goal ongoing  -SL goal ongoing  -SL    Comment (Memory Skills Goal 1, SLP) immediate recall- 10 related word pairs- characteristic coding- 90% indep  -SL 24 item " hidden picture matching task- 30%;  20 item matching task- 85%;  3 step 3s tep verbal directives with picute manipulation- 71% with occasional repetition of instructions  -SL recall of detials from picture scenes- 62% indep  -SL       Reasoning Goal 1 (SLP)    Progress/Outcomes (Reasoning Goal 1, SLP) -- -- goal ongoing  -SL    Comment (Reasoning Goal 1, SLP) -- -- deduction by exclusion calendar task- 71% with min cues for details and attn  -SL       Functional Problem Solving Skills Goal 1 (SLP)    Barriers (Problem Solving Goal 1, SLP) impaired safety awareness, judgement, memory, insight- noted to get out of bed within 5 min of instructing pt not to get up without calling for assistance-  -SL -- --    Progress/Outcomes (Problem Solving Goal 1, SLP) goal ongoing  -SL -- --    Comment (Problem Solving Goal 1, SLP) category matrix- initial letter restrictions-75% indep  -SL -- --       Functional Math Skills Goal 1 (SLP)    Progress/Outcomes (Functional Math Skills Goal 1, SLP) -- -- goal ongoing;continuing progress toward goal  -SL    Comment (Functional Math Skills Goal 1, SLP) -- -- adding denomination amts- 100% indep  -    Row Name 08/03/22 0943 08/02/22 1330 08/02/22 0930       Memory Skills Goal 1 (SLP)    Progress (Memory Skills Goal 1, SLP) -- 80%;with minimal cues (75-90%)  -CB --    Progress/Outcomes (Memory Skills Goal 1, SLP) goal ongoing  -SL goal ongoing  -CB --    Comment (Memory Skills Goal 1, SLP) immed visual  recall- 4 words- spatial components- 25% for recall of sptial components; 42% for recall of words ( without spatial restrictions)  - associative word lists  -CB --       Functional Problem Solving Skills Goal 1 (SLP)    Barriers (Problem Solving Goal 1, SLP) slow to process info and analyze infromation  -SL -- --    Progress/Outcomes (Problem Solving Goal 1, SLP) goal ongoing  -SL -- --    Comment (Problem Solving Goal 1, SLP) answering ?'s re; pool/water park based on chart- 33% indep;   67% with min cues  -SL -- --       Additional Goal 1 (SLP)    Additional Goal 1, SLP -- -- Pt will decrease vocal fatigue w/use of vocal exercises  -CB    Time Frame (Additional Goal 1, SLP) -- -- by discharge  -CB    Progress/Outcomes (Additional Goal 1, SLP) -- -- good progress toward goal  -CB    Row Name 08/01/22 1400             Attention Goal 1 (SLP)    Improve Attention by Goal 1 (SLP) complete divided attention task;complete selective attention task;90%;independently (over 90% accuracy)  -SR      Time Frame (Attention Goal 1, SLP) by discharge  -SR      Progress/Outcomes (Attention Goal 1, SLP) goal ongoing  -SR      Comment (Attention Goal 1, SLP) Four component written directions; 80% acc given min cues.  -SR              Memory Skills Goal 1 (SLP)    Improve Memory Skills Through Goal 1 (SLP) recall details of the day;use memory strategies;use written schedule;listen to a paragraph and answer questions;read a paragraph and answer questions;visual memory task;select a word from a list by exclusion;repeat list in sequential order;recall details from a word list;recalling unrelated word lists with an imposed delay;recalling related word lists with an imposed delay;90%  -SR      Time Frame (Memory Skills Goal 1, SLP) by discharge  -SR      Progress/Outcomes (Memory Skills Goal 1, SLP) goal ongoing  -SR      Comment (Memory Skills Goal 1, SLP) Immediate recall of 5 words; 30% acc independently; 90% acc given min cues  -SR              Reasoning Goal 1 (SLP)    Improve Reasoning Through Goal 1 (SLP) complete basic reasoning task;complete high level reasoning task;complete deductive reasoning task;complete mental flexibility task;complete logic/creative thinking task;90%;independently (over 90% accuracy)  -SR      Time Frame (Reasoning Goal 1, SLP) by discharge  -SR      Progress/Outcomes (Reasoning Goal 1, SLP) goal ongoing  -SR      Comment (Reasoning Goal 1, SLP) Identified incongruities on restaurant menu  with 70% acc independently; 90% acc given min cues. Pt benefitted from verbal cues for attention to detail.  -SR            User Key  (r) = Recorded By, (t) = Taken By, (c) = Cosigned By    Initials Name Provider Type    Roselia Corbett MS CCC-SLP Speech and Language Pathologist    Tarsha Elmore, SLP Speech and Language Pathologist    SR Lydia Taylor, SLP Speech and Language Pathologist                            Time Calculation:        Time Calculation- SLP     Row Name 08/04/22 1359 08/04/22 1028          Time Calculation- SLP    SLP Start Time 1330  -SL 1000  -SL     SLP Stop Time 1400  -SL 1030  -SL     SLP Time Calculation (min) 30 min  -SL 30 min  -SL           User Key  (r) = Recorded By, (t) = Taken By, (c) = Cosigned By    Initials Name Provider Type    Roselia Corbett MS CCC-SLP Speech and Language Pathologist                  Therapy Charges for Today     Code Description Service Date Service Provider Modifiers Qty    87062869900 HC ST DEV OF COGN SKILLS INITIAL 15 MIN 8/3/2022 Roselia Ibrahim MS CCC-SLP  1    72204800905 HC ST DEV OF COGN SKILLS EACH ADDT'L 15 MIN 8/3/2022 Roselia Ibrahim MS CCC-SLP  1    88362594479 HC ST DEV OF COGN SKILLS EACH ADDT'L 15 MIN 8/3/2022 Roselia Ibrahim MS CCC-SLP  2    34390127919 HC ST DEV OF COGN SKILLS INITIAL 15 MIN 8/4/2022 Roselia Ibrahim MS CCC-SLP  1    11908964812 HC ST DEV OF COGN SKILLS EACH ADDT'L 15 MIN 8/4/2022 Roselia Ibrahim MS CCC-SLP  1    [id number] HC ST DEV OF COGN SKILLS EACH ADDT'L 15 MIN 8/4/2022 Roselia Ibrahim MS CCC-SLP  2                           Roselia Ibrahim MS CCC-HEIDI  8/4/2022

## 2022-08-04 NOTE — PROGRESS NOTES
SECTION GG      Mobility Performance Discharge:     Roll Left and Right: Patient completed the activities by him/herself with no  assistance from a helper.   Sit to Lying: Patient completed the activities by him/herself with no  assistance from a helper.   Lying to Sitting on Side of Bed: Patient completed the activities by  him/herself with no assistance from a helper.   Sit to Stand: [place] provides verbal cues and/or touching/steadying and/or  contact guard assistance as patient completes activity. Assistance may be  provided throughout the activity or intermittently.   Chair/Bed to Chair Transfer: [place] provides verbal cues and/or  touching/steadying and/or contact guard assistance as patient completes  activity. Assistance may be provided throughout the activity or intermittently.   Car Transfer: [place] provides verbal cues and/or touching/steadying and/or  contact guard assistance as patient completes activity. Assistance may be  provided throughout the activity or intermittently.   Walk 10 Feet:   [place] provides verbal cues and/or touching/steadying and/or  contact guard assistance as patient completes activity. Assistance may be  provided throughout the activity or intermittently.  Walk 50 Feet with 2 Turns:   [place] provides verbal cues and/or  touching/steadying and/or contact guard assistance as patient completes  activity. Assistance may be provided throughout the activity or intermittently.  Walk 150 Feet:   [place] provides verbal cues and/or touching/steadying and/or  contact guard assistance as patient completes activity. Assistance may be  provided throughout the activity or intermittently.  Walking 10 Feet on Uneven Surfaces:   [place] provides verbal cues and/or  touching/steadying and/or contact guard assistance as patient completes  activity. Assistance may be provided throughout the activity or intermittently.  1 Step Over Curb or Up/Down Stair:   [place] provides verbal cues  and/or  touching/steadying and/or contact guard assistance as patient completes  activity. Assistance may be provided throughout the activity or intermittently.  4 Steps Up and Down, With/Without Rail:   [place] provides verbal cues and/or  touching/steadying and/or contact guard assistance as patient completes  activity. Assistance may be provided throughout the activity or intermittently.  12 Steps Up and Down, With/Without Rail:   [place] provides verbal cues and/or  touching/steadying and/or contact guard assistance as patient completes  activity. Assistance may be provided throughout the activity or intermittently.  Picking up an Object:   [place] provides verbal cues and/or touching/steadying  and/or contact guard assistance as patient completes activity. Assistance may be  provided throughout the activity or intermittently. Uses Wheelchair and/or  Scooter: No    Signed by: Keiry Roth PTA

## 2022-08-04 NOTE — PROGRESS NOTES
[facility]     Progress Note    Patient Name: Shiela Turk Jr.  : 1945  MRN: [id number]  Primary Care Physician:  Lay Ordonez MD  Date of admission: 2022    Subjective   Subjective     Chief Complaint: Functional Impairments related to R MCA CVA w/ L Hemiparesis    History of Present Illness       Reports tolerating activities today.  Left hand feels better.  Still left hip soreness but mobilizing well.  No new weakness.            Objective   Objective     Vitals:   Temp:  [97.9 °F (36.6 °C)-98.6 °F (37 °C)] 98.1 °F (36.7 °C)  Heart Rate:  [64-82] 82  Resp:  [18-20] 18  BP: ()/(61-72) 106/72    Physical Exam   Vitals reviewed.   Constitutional:       General: He is not in acute distress.     Appearance: Normal appearance.   HENT:       Cardiovascular:      Rate and Rhythm: Normal rate and regular rhythm.      Pulses: Normal pulses.      Heart sounds: No murmur heard.    No friction rub. No gallop.   Pulmonary:      Effort: Pulmonary effort is normal. No respiratory distress.      Breath sounds: Normal breath sounds. No wheezing or rales.   Abdominal:      General: Bowel sounds are normal. There is no distension.      Palpations: Abdomen is soft.      Tenderness: There is no abdominal tenderness.   Musculoskeletal:         General: No tenderness.     No significant edema in the bilateral lower extremities.       L 2nd MCP joint much improved no significant swelling or tenderness      Skin:     General: Skin is warm and dry.      Findings: No erythema.   Neurological:      Mental Status: He is alert and oriented to person, place, and time.   it.      Motor:      Comments: Appropriate in conversation; no aphasia; RUE/RLE 5/5; L shoulder abduction 4/5; L EF/EE/WE 4+/5; good finger flexion range of motion and takes resistance better with less pain.  LLE 4+/5      Psychiatric:         Mood and Affect: Mood normal.         Behavior: Behavior normal.     Result Review    Result  Review:    Results from last 7 days   Lab Units 08/04/22  0654 08/01/22  0451   WBC 10*3/mm3 4.43 4.62   HEMOGLOBIN g/dL 15.0 15.3   HEMATOCRIT % 43.9 44.5   PLATELETS 10*3/mm3 319 316     Results from last 7 days   Lab Units 08/04/22  0654 08/01/22  0451   SODIUM mmol/L 137 137   POTASSIUM mmol/L 4.0 4.0   CHLORIDE mmol/L 104 103   CO2 mmol/L 26.3 26.9   BUN mg/dL 15 10   CREATININE mg/dL 0.91 0.94   CALCIUM mg/dL 9.3 9.2   GLUCOSE mg/dL 82 82          Latest Reference Range & Units 07/25/22 05:56 07/26/22 11:55 07/27/22 16:44 07/28/22 05:46   Uric Acid 3.4 - 7.0 mg/dL  6.6     TSH Baseline 0.270 - 4.200 uIU/mL    7.110 (H)   Iron 59 - 158 mcg/dL   29 (L)    Iron Saturation 20 - 50 %   11 (L)    Transferrin 200 - 360 mg/dL   185 (L)    TIBC 298 - 536 mcg/dL   276 (L)    C-Reactive Protein 0.00 - 0.50 mg/dL   10.28 (H)    Magnesium 1.6 - 2.4 mg/dL   1.9    Vitamin B-12 211 - 946 pg/mL  659     25 Hydroxy, Vitamin D 30.0 - 100.0 ng/ml 20.0 (L)         Latest Reference Range & Units 07/28/22 05:46   Free T4 0.93 - 1.70 ng/dL 1.45         Most notable findings include: L hand plain films - 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 Minimal  degenerative spurring is apparent at the left femoral head. Hip joint  spaces appear preserved. Degenerative changes are noted in the partly  included lumbar spine. Follow-up/further evaluation can be obtained as  indications persist.     LEFT HAND CT WITHOUT CONTRAST JULY 25, 2022     HISTORY: Wrist and hand pain around the 2nd metacarpophalangeal joint  with swelling after a fall one week ago.     TECHNIQUE: Axial CT of the left hand and wrist with multiplanar  reformatted images is provided and correlated with  hand x-rays acquired  yesterday.     Radiation dose reduction techniques were utilized, including automated  exposure control and exposure modulation based on body size.     FINDINGS: There is arthritic change throughout the hand and the wrist  with marginal osteophytes, subcortical cysts, and irregular joint space  narrowing. There is no evidence of fracture around the 2nd  metacarpophalangeal joint or elsewhere in the hand or the wrist. No  joint effusion or other focal fluid collection is present. There appears  to be subcutaneous edema around the thenar side of the 2nd digit at the  level of the metacarpophalangeal joint. As can best be appreciated with  this modality, the collateral ligaments at that articulation appear to  be intact and the flexor and extensor tendons in the hand and the wrist  appear intact. Hand musculature is normal. There is no soft tissue cyst  or mass.     IMPRESSION:  Advanced, chronic arthritic change at the hand and the  wrist. Mild subcutaneous edema along the thenar side of the 2nd digit  around the metacarpophalangeal joint. However, there is no CT evidence  of fracture.      Assessment & Plan   Assessment / Plan     Brief Patient Summary:  Shiela Turk Jr. is a 76 y.o. male w/ functional impairments 2/2 R pontine/lacunar infacrt    Active Hospital Problems:  Active Hospital Problems    Diagnosis    • Right pontine stroke (HCC)      Plan:   R Pontine/Lacunar CVA  - secondary stroke ppx  - monitor BP; LDL goal <70  - Vit B12 and D levels ordered to see if supplementation indicated  July 25-vitamin D deficiency-replace.     L Hemiparesis  - will benefit from functional e-stim  - may benefit from L AFO  - recommend to defer SSRI at this time    L Hand and L Hip Pain  - thought to be from fall  - L hand plain films personally reviewed - osteoarthritis changes seen in 1st MCP; no acute Fx or dislocation  - will trial topical diclofenac; ice therapy  - PRN norco  July 25-still with  pain and increased swelling at the second MCP joint.  Swelling in the digit.  Will get CT of the hand to assess for any fracture not seen on plain x-ray.  Also obtain x-ray of the left hip-both imaging negative for fracture or any evidence of ligamentous injury given limitations of  the techniques.  July 26 - Continues with pain at the left second MCP joint with swelling that goes along the digit.  Does not describe any numbness at the distal digit.  He has pain at the MCP joint with palpation as well as with any active or passive movement.  He relates the onset of the pain to when he had the fall at home a week ago.  Reviewed plain x-ray and CT scan did not show any fracture.  As it is temporally related to a fall at home, one concern would be for ligamentous injury.  Will get hand surgery consultation to assess further.  We will presently defer any MRI imaging to hand surgery-he had recent loop recorder placed that is MRI compatible with some adjustments in the protocol such as 1.5 magnet.  Discussed the other considerations could be coincidental gout and will check a uric acid level.  He denies any past history of gout. Add: uric acid 6.6 WNL  July 27-Nursing and Occupational Therapy report increased pain and swelling at the left 2nd MCP joint.  No fever.  Has been on diclofenac gel for 2.5 days but has declined a couple of doses.  On questioning on Monday and Tuesday, the patient related the onset to when he had a fall at home around July 19.  However, given the progressive nature of his symptoms, one consideration could be possible gout and  will do a trial of colchicine 0.6 mg now, second dose in 6 hours, then daily for 5 days after that.  Hold atorvastatin while on colchicine.  Evaluated by hand surgery-continue present regimen.  If not improved by Monday on follow-up, will consider Kenalog injection.  Labs today showed WBC 6.7 with 55 neutrophils, CRP elevated 10.78, sed rate elevated 53, iron low at 29,  hemoglobin 15.9.  July 28-still with pain but looks less swollen at the second MCP joint and less tender along the digit.  In occupational therapy also notes less swelling and pain at the left hand today.  Reviewed patient continue with plan for diclofenac topically and colchicine.    July 29-left midfoot-dorsal-pain.  No trauma.  No warmth erythema.  May have some component of gout at the left midfoot.  We will continue with present colchicine.  Describes the pain with weightbearing but not at rest.  Will defer any imaging presently.  No swelling in the leg.  Homans negative.  July 30 - Pain improving. Suspect gout given hx. Monitor  August 1-left index finger MCP joint much better.  - will continue colchicine for couple more days.  August 2-visible contour difference at the left buttock and lateral hip compared to the right and area appears firm and not soft.  Appears firmer than the hematoma.  - will check CT scan.  X-ray previously was negative for fracture.  Still with pain at the lateral hip but ambulating 300 feet.  Finger pain continues improved.  Addendum-CT left hip without fracture or hematoma.  Has arthritic changes in the pelvis.    Pain management-diclofenac gel left hand.  Taking hydrocodone once or twice a day.  Dispense Norco 5 mg 8 tabs at discharge.  Gregory report reviewed.     HLD  BPH  Nutrition  GI ppx - senna-s 2 tab daily; 100mg colace BID; encouraged mobility/hydration     DVT prophylaxis: SCDs and Lovenox.    Thyroid-July 28-TSH elevated 7.1.  Check T4.  Subclinical hypothyroidism can be protective immediately after stroke.  July 29-Free T4 equal 1.45.  Subclinical hypothyroidism can be protective/beneficial status post recent stroke.  No changes in regimen.     CODE STATUS:    Level Of Support Discussed With: Patient  Code Status (Patient has no pulse and is not breathing): CPR (Attempt to Resuscitate)  Medical Interventions (Patient has pulse or is breathing): Full Support    Now admit  for comprehensive acute inpatient rehabilitation .  This would be an interdisciplinary program with physical therapy 1 hour,  occupational therapy 1 hour, and speech therapy 1 hour, 5 days a week.  Rehabilitation nursing for carryover, monitoring of neurologic   status, bowel and bladder, and skin  Ongoing physician follow-up.  Weekly team conferences.    Goal is for home with outpatient  therapies.  Barrier to discharge:  impaired mobility and self-care - work on   strength, coordination, balance, progressive ambulation, ADLs     to overcome.     TEAM CONF - July 26 - BED CTG. TRANSFERS MIN. GAIT 180 FEET LEFT PLATFORM RW, MIN ASSIST. WEARS B KNEE BRACES CHRONICALLY WHEN MORE ACTIVE.  TOILET TRANSFERS MIN. BATH MIN. UBD SBA. LBD MIN. GROOMING SBA. LIMITED BY LEFT 2ND MCP PAIN/ SWELLING - CT NEGATIVE FOR FRACTURE.  CLQT - MILD COGNITVE LINGUISTIC DEFICITS. DIFFICULTY WITH THOUGHT FLEXIBILITY, DELAYED RECALL AND DIVIDED ATTENTION. PASTORAL COUNSELING SEEN. PATIENT FEELS LOSS OF CONTROL - WILL FOLLOW. SKIN INTACT. DICLOFENAC CREAM TO LEFT 2ND MCP. CONTINENT BOWEL AND BLADDER.   [facility] - 1.5 TO 2 WEEKS.       TEAM CONF - AUGUST 2 -  BED MODIFIED INDEPENDENT ASSIST. TRANSFERS CTG. GAIT 300 RW CTG. TOILET TRANSFERS CTG SBA. SHOWER TRANSFERS CTG SBA. BATH SBA. LBD CTG. UBD SET UP. GROOMING SET UP TOILETING SBA. IMPAIRED MEMORY WITH TASKS. LEFT 2ND MCP IMPROVED, DECREASED SWELLING AND PAIN, BETTER ROM, SEEN IN FOLLOW UP BY HAND SURGERY AND NO INJECTION, CONTINUE COURSE OF COLCHICINE/ DICLOFENC CREAM. ICE TO LEFT HAND/ ICE/HEAT TO LEFT HIP. COGNITION - PROBLEM SOLVING, ATTENTION TO DETAIL, MEMORY, POOR AWARENESS OF ERRORS.   [facility] - END OF WEEK. OUTPATIENT OT,PT, SLP     Alvino Melendez MD    During rounds, used appropriate personal protective equipment including mask and gloves.  Additional gown if indicated.  Mask used was standard procedure mask. Appropriate PPE was worn during the entire visit.  Hand hygiene was  completed before and after.

## 2022-08-04 NOTE — PLAN OF CARE
Goal Outcome Evaluation:  Plan of Care Reviewed With: patient        Progress: improving  Outcome Evaluation: No pain voiced today. Pt preparing for DC in the am. Transfering with one staff.

## 2022-08-04 NOTE — THERAPY DISCHARGE NOTE
Inpatient Rehabilitation - Physical Therapy Treatment Note/Discharge  [facility]     Patient Name: Shiela Turk Jr.  : 1945  MRN: [id number]  Today's Date: 2022                Admit Date: 2022    Visit Dx:    ICD-10-CM ICD-9-CM   1. Impaired gait and mobility  R26.89 781.2   2. Follow-up exam  Z09 V67.9   3. Right pontine stroke (HCC)  I63.50 434.91     Patient Active Problem List   Diagnosis   • Right pontine stroke (HCC)     Past Medical History:   Diagnosis Date   • Arthritis    • Arthritis of back    • Arthritis of knee    • BPH (benign prostatic hyperplasia)    • Cancer (HCC)    • Cancer of kidney (HCC)    • COVID    • Rheumatic fever    • Stroke (HCC)      Past Surgical History:   Procedure Laterality Date   • CYST REMOVAL     • NEPHRECTOMY     • SOFT TISSUE BIOPSY         PT ASSESSMENT (last 12 hours)     IRF PT Evaluation and Treatment     Row Name 22 0929          PT Time and Intention    Document Type daily treatment  -LB     Mode of Treatment physical therapy  -LB     Patient/Family/Caregiver Comments/Observations Seated in w/c. NAD  -LB     Row Name 22 0929          General Information    Existing Precautions/Restrictions fall  -LB     Row Name 22 0929          Pain Assessment    Pretreatment Pain Rating 7/10  -LB     Pain Location - Side/Orientation Left  -LB     Pain Location - hip  -LB     Pre/Posttreatment Pain Comment Amb w pnt. Premedicated.  -LB     Row Name 22 0929          Bed Mobility    Rolling Left Isabella (Bed Mobility) independent  -LB     Rolling Right Isabella (Bed Mobility) independent  -LB     Supine-Sit Isabella (Bed Mobility) independent  -LB     Sit-Supine Isabella (Bed Mobility) independent  -LB     Comment, (Bed Mobility) assessed on txment mat  -LB     Row Name 22 0929          Transfers    Bed-Chair Isabella (Transfers) supervision  -LB     Assistive Device (Bed-Chair Transfers) wheelchair  -LB     Sit-Stand  Wheeler (Transfers) supervision  -LB     Stand-Sit Wheeler (Transfers) supervision  -LB     Row Name 08/04/22 0929          Sit-Stand Transfer    Assistive Device (Sit-Stand Transfers) walker, front-wheeled;wheelchair  -LB     Row Name 08/04/22 0929          Stand-Sit Transfer    Assistive Device (Stand-Sit Transfers) walker, front-wheeled;wheelchair  -LB     Row Name 08/04/22 0929          Car Transfer    Type (Car Transfer) sit-stand;stand-sit  -LB     Wheeler Level (Car Transfer) supervision  -LB     Assistive Device (Car Transfer) walker, front-wheeled  -LB     Row Name 08/04/22 0929          Gait/Stairs (Locomotion)    Wheeler Level (Gait) supervision  -LB     Assistive Device (Gait) walker, front-wheeled  -LB     Distance in Feet (Gait) 400  -LB     Pattern (Gait) step-through  -LB     Deviations/Abnormal Patterns (Gait) base of support, narrow;antalgic;stride length decreased  -LB     Bilateral Gait Deviations forward flexed posture  -LB     Left Sided Gait Deviations decreased knee extension;heel strike decreased  -LB     Whe[place] Level (Stairs) contact guard;verbal cues  -LB     Handrail Location (Stairs) left side (ascending);right side (descending)  -LB     Number of Steps (Stairs) 12  -LB     Ascending Technique (Stairs) step-to-step  -LB     Descending Technique (Stairs) step-to-step  -LB     Row Name 08/04/22 0929          Curb Negotiation (Mobility)    Wheeler, Curb Negotiation contact guard;verbal cues  -LB     Assistive Device (Curb Negotiation) walker, front-wheeled  -LB     Row Name 08/04/22 0929          Rough/Uneven Surface Gait Skills (Mobility)    Wheeler, Gait on Rough/Uneven Surface (Mobility) contact guard  -LB     Assistive Device (Rough/Uneven Surface Gait) walker, front-wheeled  -LB     Distance in Feet (Rough/Uneven Surface Gait) 12  -LB     Comment, Gait Rough/Uneven Surface (Mobility) red mat  -LB     Row Name 08/04/22 0929          Balance     Comment, Balance While standing w rwx, picked up an object from floor w cga/sba  -LB     Row Name 08/04/22 0929          Hip (Therapeutic Exercise)    Hip AROM (Therapeutic Exercise) bilateral;aBduction;sidelying;15 repititions  -LB     Hip Strengthening (Therapeutic Exercise) bilateral;flexion;aBduction;sitting;resistance band;green;15 repititions  -LB     Row Name 08/04/22 0929          Knee (Therapeutic Exercise)    Knee AROM (Therapeutic Exercise) bilateral;SAQ (short arc quad);supine;15 repititions  HS w bumpy ball  -LB     Knee Strengthening (Therapeutic Exercise) bilateral;flexion;extension;sitting;resistance band;green;15 repititions  -LB     Row Name 08/04/22 0929          Ankle (Therapeutic Exercise)    Ankle Strengthening (Therapeutic Exercise) bilateral;dorsiflexion;plantarflexion;sitting;green;resistance band;15 repititions  -LB     Row Name 08/04/22 0929          Positioning and Restraints    Post Treatment Position wheelchair  -LB     In Wheelchair sitting;exit alarm on;with OT  -LB     Row Name 08/04/22 0929          Bed Mobility Goal 1 (PT-IRF)    Activity/Assistive Device (Bed Mobility Goal 1, PT-IRF) bed mobility activities, all  -LB     [place] Level (Bed Mobility Goal 1, PT-IRF) modified independence  -LB     Progress/Outcomes (Bed Mobility Goal 1, PT-IRF) goal met  -LB     Row Name 08/04/22 0929          Transfer Goal 1 (PT-IRF)    Activity/Assistive Device (Transfer Goal 1, PT-IRF) sit-to-stand/stand-to-sit;bed-to-chair/chair-to-bed  w walker or cane as needed  -LB     [place] Level (Transfer Goal 1, PT-IRF) standby assist  -LB     Progress/Outcomes (Transfer Goal 1, PT-IRF) goal met  -LB     Row Name 08/04/22 0929          Gait/Walking Locomotion Goal 1 (PT-IRF)    Activity/Assistive Device (Gait/Walking Locomotion Goal 1, PT-IRF) gait (walking locomotion)  with/ without cane, walker  -LB     Gait/Walking Locomotion Distance Goal 1 (PT-IRF) 200'  -LB     [place] Level  (Gait/Walking Locomotion Goal 1, PT-IRF) standby assist  -LB     Progress/Outcomes (Gait/Walking Locomotion Goal 1, PT-IRF) goal met  -LB     Row Name 08/04/22 0929          Stairs Goal 1 (PT-IRF)    Activity/Assistive Device (Stairs Goal 1, PT-IRF) ascending stairs;descending stairs;step-to-step;step-over step  W 1 rail  -LB     Number of Stairs (Stairs Goal 1, PT-IRF) 12  -LB     [place] Level (Stairs Goal 1, PT-IRF) contact guard required  -LB     Progress/Outcomes (Stairs Goal 1, PT-IRF) goal met  step to step  -LB     Row Name 08/04/22 0929          Balance Goal 1 (PT)    Activity/Assistive Device (Balance Goal 1, PT) standing, dynamic  no device  -LB     [place] Level/Cues Needed (Balance Goal 1, PT) contact guard assist;standby assist  w sidestep, backward, [place], etc  -LB     Progress/Outcomes (Balance Goal 1, PT) goal not met  -LB           User Key  (r) = Recorded By, (t) = Taken By, (c) = Cosigned By    Initials Name Provider Type    LB Keiry Roth PTA Physical Therapist Assistant                Physical Therapy Education                 Title: PT OT SLP Therapies (Done)     Topic: Physical Therapy (Resolved)     Point: Mobility training (Resolved)     Learning Progress Summary           Patient Acceptance, E, VU by LB at 8/4/2022 1456    Acceptance, E, VU,NR by LB at 8/3/2022 1226    Comment: curb    Acceptance, E, VU,NR by LB at 8/2/2022 1418    Comment: stairs    Acceptance, E, NR by LB at 8/1/2022 1337    Comment: car tsf, stairs    Acceptance, E, NR by LB at 7/30/2022 1213    Comment: stairs    Acceptance, E, NR by LB at 7/29/2022 1453    Acceptance, E,D, NR by KP at 7/28/2022 1513    Acceptance, E, NR by LB at 7/27/2022 0959    Comment: car tsf    Acceptance, E, NR by LB at 7/26/2022 0957    Comment: stairs, curb    Acceptance, E, NR by LB at 7/25/2022 0955    Comment: Amb    Acceptance, E,TB, VU,NR by JORGE LUIS at 7/24/2022 1573    Comment: not to get up on his own, call for assist    Significant Other Acceptance, E,D, VU,DU by LB at 8/3/2022 1256    Comment: car, curb, stairs, amb. (Observed car tsf).                   Point: Home exercise program (Resolved)     Learning Progress Summary           Patient Acceptance, E,D, NR by  at 7/28/2022 1513                   Point: Body mechanics (Resolved)     Learning Progress Summary           Significant Other Acceptance, E, VU by LB at 8/3/2022 1256                   Point: Precautions (Resolved)     Learning Progress Summary           Significant Other Acceptance, E, VU by LB at 8/3/2022 1256                               User Key     Initials Effective Dates Name Provider Type Discipline     06/16/21 -  Kanwal Coleman, PT Physical Therapist PT     03/07/18 -  Keiry Roth PTA Physical Therapist Assistant PT     06/16/21 -  Destiny Steiner PT Physical Therapist PT                PT Recommendation and Plan       Patient was wearing a face mask during this therapy encounter. Therapist used appropriate personal protective equipment including mask and gloves.  Mask used was standard procedure mask. Appropriate PPE was worn during the entire therapy session. Hand hygiene was completed before and after therapy session. Patient is not in enhanced droplet precautions.               Time Calculation:    PT Charges     Row Name 08/04/22 1241 08/04/22 0929          Time Calculation    Start Time 1230  -LB 0830  -LB     Stop Time 1300  -LB 0930  -LB     Time Calculation (min) 30 min  -LB 60 min  -LB           User Key  (r) = Recorded By, (t) = Taken By, (c) = Cosigned By    Initials Name Provider Type    LB Keiry Roth PTA Physical Therapist Assistant                Therapy Charges for Today     Code Description Service Date Service Provider Modifiers Qty    02846106647 HC PT THER PROC EA 15 MIN 8/3/2022 Keiry Roth, WAN GP 4    16609589121 HC PT THER PROC EA 15 MIN 8/4/2022 Keiry Roth, WAN GP 6               PT  Discharge Summary  Reason for Discharge: Discharge from facility  Outcomes Achieved: Patient able to partially acheive established goals  Discharge Destination: Home with assist, Home with outpatient services    Keiry Roth, PTA  8/4/2022

## 2022-08-04 NOTE — PROGRESS NOTES
Inpatient Rehabilitation Plan of Care Note    Plan of Care  Care Plan Reviewed - No updates at this time.    Safety    Performed Intervention(s)  Falls protocal  Btrm schdule every 4 hours  items within reach      Sphincter Control    Performed Intervention(s)  Montior I and O  Stool softener as needed  Bowel program of choice  Btrm schedule      Psychosocial    Performed Intervention(s)  Support/peer groups  Verbalizes needs and concerns    Signed by: Priya Pimentel RN

## 2022-08-04 NOTE — PROGRESS NOTES
SECTION GG      Self Care Performance Discharge:   Oral Hygiene: [place] provides verbal cues and/or touching/steadying and/or  contact guard assistance as patient completes activity.   Oral Hygiene: [place] sets up or cleans up; patient completes activity. [place]  assists only prior to or following the activity.   Toileting Hygiene: : [place] provides verbal cues and/or touching/steadying  and/or contact guard assistance as patient completes activity.   Shower/Bathe Self: [place] provides verbal cues and/or touching/steadying and/or  contact guard assistance as patient completes activity.   Upper Body Dressing: [place] sets up or cleans up; patient completes activity.  [place] assists only prior to or following the activity.   Lower Body Dressing: [place] provides verbal cues and/or touching/steadying  and/or contact guard assistance as patient completes activity.   Putting On/Taking Off Footwear: [place] provides verbal cues and/or  touching/steadying and/or contact guard assistance as patient completes  activity.    Mobility Toilet Transfer Discharge: [place] provides verbal cues or  touching/steadying assistance as patient completes activity.    Signed by: Phoebe Healy OTR/GENOVEVA

## 2022-08-05 VITALS
RESPIRATION RATE: 18 BRPM | SYSTOLIC BLOOD PRESSURE: 112 MMHG | WEIGHT: 195.55 LBS | BODY MASS INDEX: 26.49 KG/M2 | HEIGHT: 72 IN | OXYGEN SATURATION: 98 % | HEART RATE: 59 BPM | DIASTOLIC BLOOD PRESSURE: 73 MMHG | TEMPERATURE: 97.4 F

## 2022-08-05 RX ORDER — ATORVASTATIN CALCIUM 80 MG/1
40 TABLET, FILM COATED ORAL NIGHTLY
Start: 2022-08-05

## 2022-08-05 RX ORDER — ATORVASTATIN CALCIUM 20 MG/1
40 TABLET, FILM COATED ORAL NIGHTLY
Status: DISCONTINUED | OUTPATIENT
Start: 2022-08-05 | End: 2022-08-05 | Stop reason: HOSPADM

## 2022-08-05 RX ADMIN — DICLOFENAC SODIUM 2 G: 10 GEL TOPICAL at 08:13

## 2022-08-05 RX ADMIN — TAMSULOSIN HYDROCHLORIDE 0.4 MG: 0.4 CAPSULE ORAL at 08:12

## 2022-08-05 RX ADMIN — DOCUSATE SODIUM 50MG AND SENNOSIDES 8.6MG 2 TABLET: 8.6; 5 TABLET, FILM COATED ORAL at 08:12

## 2022-08-05 RX ADMIN — ASPIRIN 81 MG: 81 TABLET, COATED ORAL at 08:12

## 2022-08-05 RX ADMIN — DOCUSATE SODIUM 100 MG: 100 CAPSULE, LIQUID FILLED ORAL at 08:13

## 2022-08-05 NOTE — PLAN OF CARE
Goal Outcome Evaluation:           Progress: improving  Outcome Evaluation: Stroke. L. robel. L. facial droop. HR runs faustino. Continent B&B. Last BM 7/29. Given bowel meds. Hx. falls. Fell here. Pain to L. hand. Edema to pointer finger on L. hand. Pain has improved. Swelling has lessened. Applied voltaren gel. Labs: M/Th: CBC and BMP. Prefers the bedroom door to remain closed at all times and keep the lights off. Meds whole with water. Diet: Reg, thins. Did not eat much at meals. Assistx1 with wheelchair. D/C home Friday, 8/5. Family conference completed. A&OX4. Forgetful.

## 2022-08-05 NOTE — PROGRESS NOTES
Patient d/c today, 08/05/22, home with his wife. Patient has [facility] insurance and must have an initial f/u appointment with his PCP, Dr. Garcia before the VA will approve payment to [facility]. Patient has his appointment with Dr. Garcia on 08/16/22. SW spoke to patient and his wife about calling [facility] once they have been to PCP appointment so [facility] can schedule patient for outpatient PT, OT, and ST and work out how VA will cover therapies. Patient is SBA and patient's wife has quit her job to be with patient 24/7. Patient has a rolling walker and shower chair.

## 2022-08-05 NOTE — PLAN OF CARE
Goal Outcome Evaluation:                Problem: Rehabilitation (IRF) Plan of Care  Goal: Plan of Care Review  Outcome: Ongoing, Progressing  Flowsheets  Taken 8/5/2022 0305 by Amanda Lopez RN  Outcome Evaluation: Received report at 2230 this evening. Pt rested with eyes closed. Continent of bowel and bladder. Urinal at bedside. no complaints of pain or discomfort. pt able to make adjustments in bed. Pt ready to d/c in am. No unsafe behaviors. Call light within reach.

## 2022-08-05 NOTE — PROGRESS NOTES
SECTION GG    Eating Performance Discharge: [place] sets up or cleans up; patient completes  activity. [place] assists only prior to or following the activity.    Signed by: Maris Jefferson RN

## 2022-08-05 NOTE — PROGRESS NOTES
[facility]     Progress Note    Patient Name: Shiela Turk Jr.  : 1945  MRN: [id number]  Primary Care Physician:  Lay Ordonez MD  Date of admission: 2022    Subjective   Subjective     Chief Complaint: Functional Impairments related to R MCA CVA w/ L Hemiparesis    History of Present Illness       Patient feels ready for home.  Left side strength improving with still with weakness in the left arm.  Left hip soreness is same.  Mobilizing well.  Reviewed need to get follow-up labs to check cholesterol level and liver enzymes later this month-in his discharge instructions.  Reviewed no driving at this time.  No alcohol.            Objective   Objective     Vitals:   Temp:  [97.1 °F (36.2 °C)-98.1 °F (36.7 °C)] 97.4 °F (36.3 °C)  Heart Rate:  [59-82] 59  Resp:  [18-20] 18  BP: (106-115)/(66-73) 112/73    Physical Exam   Vitals reviewed.   Constitutional:       General: He is not in acute distress.     Appearance: Normal appearance.   HENT:       Cardiovascular:      Rate and Rhythm: Normal rate and regular rhythm.      Pulses: Normal pulses.      Heart sounds: No murmur heard.    No friction rub. No gallop.   Pulmonary:      Effort: Pulmonary effort is normal. No respiratory distress.      Breath sounds: Normal breath sounds. No wheezing or rales.   Abdominal:      General: Bowel sounds are normal. There is no distension.      Palpations: Abdomen is soft.      Tenderness: There is no abdominal tenderness.   Musculoskeletal:         General: No tenderness.     No significant edema in the bilateral lower extremities.       L 2nd MCP joint much improved no significant swelling or tenderness      Skin:     General: Skin is warm and dry.      Findings: No erythema.   Neurological:      Mental Status: He is alert and oriented to person, place, and time.   it.      Motor:      Comments: Appropriate in conversation; no aphasia; RUE/RLE 5/5; L shoulder abduction 4/5; L EF/EE/WE 4+/5; good  finger flexion range of motion and takes resistance better with less pain FF 4/5.  LLE 4+/5      Psychiatric:         Mood and Affect: Mood normal.         Behavior: Behavior normal.     Result Review    Result Review:    Results from last 7 days   Lab Units 08/04/22  0654 08/01/22  0451   WBC 10*3/mm3 4.43 4.62   HEMOGLOBIN g/dL 15.0 15.3   HEMATOCRIT % 43.9 44.5   PLATELETS 10*3/mm3 319 316     Results from last 7 days   Lab Units 08/04/22  0654 08/01/22  0451   SODIUM mmol/L 137 137   POTASSIUM mmol/L 4.0 4.0   CHLORIDE mmol/L 104 103   CO2 mmol/L 26.3 26.9   BUN mg/dL 15 10   CREATININE mg/dL 0.91 0.94   CALCIUM mg/dL 9.3 9.2   BILIRUBIN mg/dL 0.3  --    ALK PHOS U/L 98  --    ALT (SGPT) U/L 62*  --    AST (SGOT) U/L 50*  --    GLUCOSE mg/dL 82 82          Latest Reference Range & Units 07/25/22 05:56 07/26/22 11:55 07/27/22 16:44 07/28/22 05:46   Uric Acid 3.4 - 7.0 mg/dL  6.6     TSH Baseline 0.270 - 4.200 uIU/mL    7.110 (H)   Iron 59 - 158 mcg/dL   29 (L)    Iron Saturation 20 - 50 %   11 (L)    Transferrin 200 - 360 mg/dL   185 (L)    TIBC 298 - 536 mcg/dL   276 (L)    C-Reactive Protein 0.00 - 0.50 mg/dL   10.28 (H)    Magnesium 1.6 - 2.4 mg/dL   1.9    Vitamin B-12 211 - 946 pg/mL  659     25 Hydroxy, Vitamin D 30.0 - 100.0 ng/ml 20.0 (L)         Latest Reference Range & Units 07/28/22 05:46   Free T4 0.93 - 1.70 ng/dL 1.45         Most notable findings include: L hand plain films - 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 Minimal  degenerative spurring is apparent at the left femoral head. Hip joint  spaces appear preserved. Degenerative changes are noted in the partly  included lumbar spine. Follow-up/further evaluation can be obtained  as  indications persist.     LEFT HAND CT WITHOUT CONTRAST JULY 25, 2022     HISTORY: Wrist and hand pain around the 2nd metacarpophalangeal joint  with swelling after a fall one week ago.     TECHNIQUE: Axial CT of the left hand and wrist with multiplanar  reformatted images is provided and correlated with hand x-rays acquired  yesterday.     Radiation dose reduction techniques were utilized, including automated  exposure control and exposure modulation based on body size.     FINDINGS: There is arthritic change throughout the hand and the wrist  with marginal osteophytes, subcortical cysts, and irregular joint space  narrowing. There is no evidence of fracture around the 2nd  metacarpophalangeal joint or elsewhere in the hand or the wrist. No  joint effusion or other focal fluid collection is present. There appears  to be subcutaneous edema around the thenar side of the 2nd digit at the  level of the metacarpophalangeal joint. As can best be appreciated with  this modality, the collateral ligaments at that articulation appear to  be intact and the flexor and extensor tendons in the hand and the wrist  appear intact. Hand musculature is normal. There is no soft tissue cyst  or mass.     IMPRESSION:  Advanced, chronic arthritic change at the hand and the  wrist. Mild subcutaneous edema along the thenar side of the 2nd digit  around the metacarpophalangeal joint. However, there is no CT evidence  of fracture.      Assessment & Plan   Assessment / Plan     Brief Patient Summary:  Shiela Turk Jr. is a 76 y.o. male w/ functional impairments 2/2 R pontine/lacunar infacrt    Active Hospital Problems:  Active Hospital Problems    Diagnosis    • Acute gout of left hand    • Right pontine stroke (HCC)      Plan:   R Pontine/Lacunar CVA  - secondary stroke ppx  - monitor BP; LDL goal <70  - Vit B12 and D levels ordered to see if supplementation indicated  July 25-vitamin D deficiency-replace.     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  Swelling in the digit.  Will get CT of the hand to assess for any fracture not seen on plain x-ray.  Also obtain x-ray of the left hip-both imaging negative for fracture or any evidence of ligamentous injury given limitations of  the techniques.  July 26 - Continues with pain at the left second MCP joint with swelling that goes along the digit.  Does not describe any numbness at the distal digit.  He has pain at the MCP joint with palpation as well as with any active or passive movement.  He relates the onset of the pain to when he had the fall at home a week ago.  Reviewed plain x-ray and CT scan did not show any fracture.  As it is temporally related to a fall at home, one concern would be for ligamentous injury.  Will get hand surgery consultation to assess further.  We will presently defer any MRI imaging to hand surgery-he had recent loop recorder placed that is MRI compatible with some adjustments in the protocol such as 1.5 magnet.  Discussed the other considerations could be coincidental gout and will check a uric acid level.  He denies any past history of gout. Add: uric acid 6.6 WNL  July 27-Nursing and Occupational Therapy report increased pain and swelling at the left 2nd MCP joint.  No fever.  Has been on diclofenac gel for 2.5 days but has declined a couple of doses.  On questioning on Monday and Tuesday, the patient related the onset to when he had a fall at home around July 19.  However, given the progressive nature of his symptoms, one consideration could be possible gout and  will do a trial of colchicine 0.6 mg now, second dose in 6 hours, then daily for 5 days after that.  Hold atorvastatin while  on colchicine.  Evaluated by hand surgery-continue present regimen.  If not improved by Monday on follow-up, will consider Kenalog injection.  Labs today showed WBC 6.7 with 55 neutrophils, CRP elevated 10.78, sed rate elevated 53, iron low at 29, hemoglobin 15.9.  July 28-still with pain but looks less swollen at the second MCP joint and less tender along the digit.  In occupational therapy also notes less swelling and pain at the left hand today.  Reviewed patient continue with plan for diclofenac topically and colchicine.    July 29-left midfoot-dorsal-pain.  No trauma.  No warmth erythema.  May have some component of gout at the left midfoot.  We will continue with present colchicine.  Describes the pain with weightbearing but not at rest.  Will defer any imaging presently.  No swelling in the leg.  Homans negative.  July 30 - Pain improving. Suspect gout given hx. Monitor  August 1-left index finger MCP joint much better.  - will continue colchicine for couple more days.  August 2-visible contour difference at the left buttock and lateral hip compared to the right and area appears firm and not soft.  Appears firmer than the hematoma.  - will check CT scan.  X-ray previously was negative for fracture.  Still with pain at the lateral hip but ambulating 300 feet.  Finger pain continues improved.  Addendum-CT left hip without fracture or hematoma.  Has arthritic changes in the pelvis.    Pain management-diclofenac gel left hand.  Taking hydrocodone once or twice a day.  Dispense Norco 5 mg 8 tabs at discharge.  Gregory report reviewed.     HLD-atorvastatin 80 mg initially.  August 5-transaminases have increased from July 21.  AST increased from 10-50, ALT increased from 7-62.  Total bilirubin unchanged 0.6-0.3 as well as alkaline phosphatase 91-98.  We will have him decrease atorvastatin to 40 mg daily and recheck with labs later this month.      BPH-Flomax     DVT prophylaxis: SCDs and Lovenox.    Thyroid-July  28-TSH elevated 7.1.  Check T4.  Subclinical hypothyroidism can be protective immediately after stroke.  July 29-Free T4 equal 1.45.  Subclinical hypothyroidism can be protective/beneficial status post recent stroke.  No changes in regimen.     CODE STATUS:    Level Of Support Discussed With: Patient  Code Status (Patient has no pulse and is not breathing): CPR (Attempt to Resuscitate)  Medical Interventions (Patient has pulse or is breathing): Full Support    Now admit for comprehensive acute inpatient rehabilitation .  This would be an interdisciplinary program with physical therapy 1 hour,  occupational therapy 1 hour, and speech therapy 1 hour, 5 days a week.  Rehabilitation nursing for carryover, monitoring of neurologic   status, bowel and bladder, and skin  Ongoing physician follow-up.  Weekly team conferences.    Goal is for home with outpatient  therapies.  Barrier to discharge:  impaired mobility and self-care - work on   strength, coordination, balance, progressive ambulation, ADLs     to overcome.     TEAM CONF - July 26 - BED CTG. TRANSFERS MIN. GAIT 180 FEET LEFT PLATFORM RW, MIN ASSIST. WEARS B KNEE BRACES CHRONICALLY WHEN MORE ACTIVE.  TOILET TRANSFERS MIN. BATH MIN. UBD SBA. LBD MIN. GROOMING SBA. LIMITED BY LEFT 2ND MCP PAIN/ SWELLING - CT NEGATIVE FOR FRACTURE.  CLQT - MILD COGNITVE LINGUISTIC DEFICITS. DIFFICULTY WITH THOUGHT FLEXIBILITY, DELAYED RECALL AND DIVIDED ATTENTION. PASTORAL COUNSELING SEEN. PATIENT FEELS LOSS OF CONTROL - WILL FOLLOW. SKIN INTACT. DICLOFENAC CREAM TO LEFT 2ND MCP. CONTINENT BOWEL AND BLADDER.   [facility] - 1.5 TO 2 WEEKS.       TEAM CONF - AUGUST 2 -  BED MODIFIED INDEPENDENT ASSIST. TRANSFERS CTG. GAIT 300 RW CTG. TOILET TRANSFERS CTG SBA. SHOWER TRANSFERS CTG SBA. BATH SBA. LBD CTG. UBD SET UP. GROOMING SET UP TOILETING SBA. IMPAIRED MEMORY WITH TASKS. LEFT 2ND MCP IMPROVED, DECREASED SWELLING AND PAIN, BETTER ROM, SEEN IN FOLLOW UP BY HAND SURGERY AND NO INJECTION,  CONTINUE COURSE OF COLCHICINE/ DICLOFENC CREAM. ICE TO LEFT HAND/ ICE/HEAT TO LEFT HIP. COGNITION - PROBLEM SOLVING, ATTENTION TO DETAIL, MEMORY, POOR AWARENESS OF ERRORS.   [facility] - END OF WEEK. OUTPATIENT OT,PT, SLP     Disposition-August 5-achieved inpatient rehabilitation goals.  Medically stable.  Medications follow-up reviewed.  Discharge home today.    Alvino Melendez MD    During rounds, used appropriate personal protective equipment including mask and gloves.  Additional gown if indicated.  Mask used was standard procedure mask. Appropriate PPE was worn during the entire visit.  Hand hygiene was completed before and after.

## 2022-08-05 NOTE — DISCHARGE SUMMARY
[facility] - REHABILITATION UNIT    JUSTINO LUTHER JR.  1945    ADMIT DATE:  7/23/2022  3:15 PM  DISCHARGE DATE:  08/05/22      CHIEF COMPLAINT:    Functional Impairments related to R MCA CVA w/ L Hemiparesis    HISTORY OF PRESENT ILLNESS:    77yo M w/ pmh of kidney Ca s/p partial nephrectomy (2010) that presented to OSH w/ L facial droop, L-sided weakness/numbness, and dizziness. He was found to have a R pontine and lacunar infarcts. He was stabilized medically but w/ persistent functional deficits. Subsequently, he was appropriate for AIR for continued therapies. His hospital course was complicated by bradycardia and intermittent R hip pain.      Today, he complained of persistent L-sided weakness (UE > LE). He denied any N/T or pain. He denied headaches, vision changes, cognitive impairments, or changes in speech. He denied fever, chest pain, SOA, or n/v/d.       HOSPITAL COURSE:    Patient participated in a comprehensive acute inpatient rehabilitation program.     During the hospital stay the following areas were addressed:      R Pontine/Lacunar CVA  - secondary stroke ppx  - monitor BP; LDL goal <70  - Vit B12 and D levels ordered to see if supplementation indicated  July 25-vitamin D deficiency-replace.     L Hemiparesis  - improving     L Hand and L Hip Pain  - thought to be from fall  - L hand plain films personally reviewed - osteoarthritis changes seen in 1st MCP; no acute Fx or dislocation  - will trial topical diclofenac; ice therapy  - PRN norco  July 25-still with pain and increased swelling at the second MCP joint.  Swelling in the digit.  Will get CT of the hand to assess for any fracture not seen on plain x-ray.  Also obtain x-ray of the left hip-both imaging negative for fracture or any evidence of ligamentous injury given limitations of  the techniques.  July 26 - Continues with pain at the left second MCP joint with swelling that goes along the digit.  Does not describe any  numbness at the distal digit.  He has pain at the MCP joint with palpation as well as with any active or passive movement.  He relates the onset of the pain to when he had the fall at home a week ago.  Reviewed plain x-ray and CT scan did not show any fracture.  As it is temporally related to a fall at home, one concern would be for ligamentous injury.  Will get hand surgery consultation to assess further.  We will presently defer any MRI imaging to hand surgery-he had recent loop recorder placed that is MRI compatible with some adjustments in the protocol such as 1.5 magnet.  Discussed the other considerations could be coincidental gout and will check a uric acid level.  He denies any past history of gout. Add: uric acid 6.6 WNL  July 27-Nursing and Occupational Therapy report increased pain and swelling at the left 2nd MCP joint.  No fever.  Has been on diclofenac gel for 2.5 days but has declined a couple of doses.  On questioning on Monday and Tuesday, the patient related the onset to when he had a fall at home around July 19.  However, given the progressive nature of his symptoms, one consideration could be possible gout and  will do a trial of colchicine 0.6 mg now, second dose in 6 hours, then daily for 5 days after that.  Hold atorvastatin while on colchicine.  Evaluated by hand surgery-continue present regimen.  If not improved by Monday on follow-up, will consider Kenalog injection.  Labs today showed WBC 6.7 with 55 neutrophils, CRP elevated 10.78, sed rate elevated 53, iron low at 29, hemoglobin 15.9.  July 28-still with pain but looks less swollen at the second MCP joint and less tender along the digit.  In occupational therapy also notes less swelling and pain at the left hand today.  Reviewed patient continue with plan for diclofenac topically and colchicine.     July 29-left midfoot-dorsal-pain.  No trauma.  No warmth erythema.  May have some component of gout at the left midfoot.  We will continue  with present colchicine.  Describes the pain with weightbearing but not at rest.  Will defer any imaging presently.  No swelling in the leg.  Homans negative.  July 30 - Pain improving. Suspect gout given hx. Monitor  August 1-left index finger MCP joint much better.  - will continue colchicine for couple more days.  August 2-visible contour difference at the left buttock and lateral hip compared to the right and area appears firm and not soft.  Appears firmer than the hematoma.  - will check CT scan.  X-ray previously was negative for fracture.  Still with pain at the lateral hip but ambulating 300 feet.  Finger pain continues improved.  Addendum-CT left hip without fracture or hematoma.  Has arthritic changes in the pelvis.     Pain management-diclofenac gel left hand.  Taking hydrocodone once or twice a day.  Dispense Norco 5 mg 8 tabs at discharge.  Gregory report reviewed.     HLD-atorvastatin 80 mg initially.  August 5-transaminases have increased from July 21.  AST increased from 10-50, ALT increased from 7-62.  Total bilirubin unchanged 0.6-0.3 as well as alkaline phosphatase 91-98.  We will have him decrease atorvastatin to 40 mg daily and recheck with labs later this month.        BPH-Flomax     DVT prophylaxis: SCDs and Lovenox.     Thyroid-July 28-TSH elevated 7.1.  Check T4.  Subclinical hypothyroidism can be protective immediately after stroke.  July 29-Free T4 equal 1.45.  Subclinical hypothyroidism can be protective/beneficial status post recent stroke.  No changes in regimen.     CODE STATUS:    Level Of Support Discussed With: Patient  Code Status (Patient has no pulse and is not breathing): CPR (Attempt to Resuscitate)  Medical Interventions (Patient has pulse or is breathing): Full Support     Rehabilitation-admit for comprehensive acute inpatient rehabilitation .  This would be an interdisciplinary program with physical therapy 1 hour,  occupational therapy 1 hour, and speech therapy 1 hour, 5  days a week.  Rehabilitation nursing for carryover, monitoring of neurologic   status, bowel and bladder, and skin  Ongoing physician follow-up.  Weekly team conferences.    Goal is for home with outpatient  therapies.  Barrier to discharge:  impaired mobility and self-care - worked on  strength, coordination, balance, progressive ambulation, ADLs     to overcome.      TEAM CONF - July 26 - BED CTG. TRANSFERS MIN. GAIT 180 FEET LEFT PLATFORM RW, MIN ASSIST. WEARS B KNEE BRACES CHRONICALLY WHEN MORE ACTIVE.  TOILET TRANSFERS MIN. BATH MIN. UBD SBA. LBD MIN. GROOMING SBA. LIMITED BY LEFT 2ND MCP PAIN/ SWELLING - CT NEGATIVE FOR FRACTURE.  CLQT - MILD COGNITVE LINGUISTIC DEFICITS. DIFFICULTY WITH THOUGHT FLEXIBILITY, DELAYED RECALL AND DIVIDED ATTENTION. PASTORAL COUNSELING SEEN. PATIENT FEELS LOSS OF CONTROL - WILL FOLLOW. SKIN INTACT. DICLOFENAC CREAM TO LEFT 2ND MCP. CONTINENT BOWEL AND BLADDER.   ELOS - 1.5 TO 2 WEEKS.         TEAM CONF - AUGUST 2 -  BED MODIFIED INDEPENDENT ASSIST. TRANSFERS CTG. GAIT 300 RW CTG. TOILET TRANSFERS CTG SBA. SHOWER TRANSFERS CTG SBA. BATH SBA. LBD CTG. UBD SET UP. GROOMING SET UP TOILETING SBA. IMPAIRED MEMORY WITH TASKS. LEFT 2ND MCP IMPROVED, DECREASED SWELLING AND PAIN, BETTER ROM, SEEN IN FOLLOW UP BY HAND SURGERY AND NO INJECTION, CONTINUE COURSE OF COLCHICINE/ DICLOFENC CREAM. ICE TO LEFT HAND/ ICE/HEAT TO LEFT HIP. COGNITION - PROBLEM SOLVING, ATTENTION TO DETAIL, MEMORY, POOR AWARENESS OF ERRORS.   ELOS - END OF WEEK. OUTPATIENT OT,PT, SLP      Disposition-August 5-achieved inpatient rehabilitation goals.  Medically stable.  Medications follow-up reviewed.  Discharge home today.      Physical Exam near the time of discharge:    Constitutional:       General: He is not in acute distress.     Appearance: Normal appearance.   HENT:   Cardiovascular:      Rate and Rhythm: Normal rate and regular rhythm.      Pulses: Normal pulses.      Heart sounds: No murmur heard.    No  friction rub. No gallop.   Pulmonary:      Effort: Pulmonary effort is normal. No respiratory distress.      Breath sounds: Normal breath sounds. No wheezing or rales.   Abdominal:      General: Bowel sounds are normal. There is no distension.      Palpations: Abdomen is soft.      Tenderness: There is no abdominal tenderness.   Musculoskeletal:         General: No tenderness.     No significant edema in the bilateral lower extremities.      L 2nd MCP joint much improved no significant swelling or tenderness    Skin:     General: Skin is warm and dry.      Findings: No erythema.   Neurological:      Mental Status: He is alert and oriented to person, place, and time.   it.      Motor:      Comments: Appropriate in conversation; no aphasia; RUE/RLE 5/5; L shoulder abduction 4/5; L EF/EE/WE 4+/5; good finger flexion range of motion and takes resistance better with less pain FF 4/5.  LLE 4+/5        Psychiatric:         Mood and Affect: Mood normal.         Behavior: Behavior normal.     RESULTS:  No results found for: POCGLU  Results from last 7 days   Lab Units 08/04/22  0654 08/01/22  0451   WBC 10*3/mm3 4.43 4.62   HEMOGLOBIN g/dL 15.0 15.3   HEMATOCRIT % 43.9 44.5   PLATELETS 10*3/mm3 319 316     Results from last 7 days   Lab Units 08/04/22  0654 08/01/22  0451   SODIUM mmol/L 137 137   POTASSIUM mmol/L 4.0 4.0   CHLORIDE mmol/L 104 103   CO2 mmol/L 26.3 26.9   BUN mg/dL 15 10   CREATININE mg/dL 0.91 0.94   CALCIUM mg/dL 9.3 9.2   BILIRUBIN mg/dL 0.3  --    ALK PHOS U/L 98  --    ALT (SGPT) U/L 62*  --    AST (SGOT) U/L 50*  --    GLUCOSE mg/dL 82 82         Latest Reference Range & Units 07/25/22 05:56 07/26/22 11:55 07/27/22 16:44 07/28/22 05:46   Uric Acid 3.4 - 7.0 mg/dL   6.6       TSH Baseline 0.270 - 4.200 uIU/mL       7.110 (H)   Iron 59 - 158 mcg/dL     29 (L)     Iron Saturation 20 - 50 %     11 (L)     Transferrin 200 - 360 mg/dL     185 (L)     TIBC 298 - 536 mcg/dL     276 (L)     C-Reactive Protein  0.00 - 0.50 mg/dL     10.28 (H)     Magnesium 1.6 - 2.4 mg/dL     1.9     Vitamin B-12 211 - 946 pg/mL   659       25 Hydroxy, Vitamin D 30.0 - 100.0 ng/ml 20.0 (L)              Latest Reference Range & Units 07/28/22 05:46   Free T4 0.93 - 1.70 ng/dL 1.45            Most notable findings include: L hand plain films - osteoarthritis changes seen in 1st MCP; no acute Fx or dislocation  XR HIP W OR WO PELVIS 2-3 VIEW LEFT- JULY 25, 2022     INDICATIONS: Trauma     TECHNIQUE: Frontal view the pelvis, 2 views of the left hip     COMPARISON: None available     FINDINGS:     No acute fracture, erosion, or dislocation is identified. Minimal  degenerative spurring is apparent at the left femoral head. Hip joint  spaces appear preserved. Degenerative changes are noted in the partly  included lumbar spine. Follow-up/further evaluation can be obtained as  indications persist.     LEFT HAND CT WITHOUT CONTRAST JULY 25, 2022     HISTORY: Wrist and hand pain around the 2nd metacarpophalangeal joint  with swelling after a fall one week ago.     TECHNIQUE: Axial CT of the left hand and wrist with multiplanar  reformatted images is provided and correlated with hand x-rays acquired  yesterday.     Radiation dose reduction techniques were utilized, including automated  exposure control and exposure modulation based on body size.     FINDINGS: There is arthritic change throughout the hand and the wrist  with marginal osteophytes, subcortical cysts, and irregular joint space  narrowing. There is no evidence of fracture around the 2nd  metacarpophalangeal joint or elsewhere in the hand or the wrist. No  joint effusion or other focal fluid collection is present. There appears  to be subcutaneous edema around the thenar side of the 2nd digit at the  level of the metacarpophalangeal joint. As can best be appreciated with  this modality, the collateral ligaments at that articulation appear to  be intact and the flexor and extensor tendons in  the hand and the wrist  appear intact. Hand musculature is normal. There is no soft tissue cyst  or mass.     IMPRESSION:  Advanced, chronic arthritic change at the hand and the  wrist. Mild subcutaneous edema along the thenar side of the 2nd digit  around the metacarpophalangeal joint. However, there is no CT evidence  of fracture.                 Discharge Medications      New Medications      Instructions Start Date   aspirin 81 MG EC tablet   81 mg, Oral, Daily      atorvastatin 80 MG tablet  Commonly known as: LIPITOR   80 mg, Oral, Nightly      atorvastatin 80 MG tablet  Commonly known as: LIPITOR   40 mg, Oral, Nightly      budesonide-formoterol 160-4.5 MCG/ACT inhaler  Commonly known as: SYMBICORT   2 puffs, Inhalation, 2 Times Daily - RT      colchicine 0.6 MG tablet   Take 1 tablet by mouth daily for 5 days as needed for gout flare.  Hold atorvastatin while on colchicine.      Diclofenac Sodium 1 % gel gel  Commonly known as: VOLTAREN   2 g, Topical, 4 Times Daily, Take with him      docusate sodium 100 MG capsule   100 mg, Oral, 2 Times Daily      HYDROcodone-acetaminophen 5-325 MG per tablet  Commonly known as: NORCO   1 tablet, Oral, Every 4 Hours PRN      sennosides-docusate 8.6-50 MG per tablet  Commonly known as: PERICOLACE   2 tablets, Oral, Daily      vitamin D 1.25 MG (78842 UT) capsule capsule  Commonly known as: ERGOCALCIFEROL   Take 1 capsule by mouth Every 7 (Seven) Days for 6 doses on Mondays.  Once complete, start Vit D 1000 units twice daily   Start Date: August 8, 2022        Continue These Medications      Instructions Start Date   fluticasone 27.5 MCG/SPRAY nasal spray  Commonly known as: VERAMYST   2 sprays, Nasal, Daily      Loratadine 10 MG capsule   10 mg, Oral, Daily      tamsulosin 0.4 MG capsule 24 hr capsule  Commonly known as: FLOMAX   1 capsule, Oral, Daily         Stop These Medications    naproxen 500 MG tablet  Commonly known as: Genia Figueredo APRN Kean,  Purvi Cormier [phone] [address]  [facility] [address]  [facility] [id number] Steps: Follow upAppointment: Instructions: Sept 29th at 11:30 am    ===    Lay Ordonez MD Vega Velez, Jara Nashelle, [facility] - GeneralInternal [phone] [facility] [address]Qhho Steps: Schedule an appointment as soon as possible for a visit on 8/16/2022Appointment: Instructions: You have an appointment with your PCP on 08/16/22 at 3:30 P.M. Please arrive 10 minutes early, bring your medication list, and wear a mask.    Address: [address] [place] [phone] [facility] [id number] Steps: Follow upAppointment: Instructions: [facility] to [facility] for ELA  Follow up with your [facility] cardiologist for Loop Recorder instructions/ results  ------------------    Pt. is scheduled on Aug. 10th. at 10:00 A.M. with the divice team at [address] [facility] [address]  [place] [address] , Phone [phone] (Work)  [phone] (Fax) for Loop Recorder.    And  for sleep study to evaluate for ELA, someone will call the pt. about it, per Tina.    ===    Alvino Melendez MD Gormley, John Michael, [facility] and [phone] Sona Godinez  [facility] [address]  [facility] [id number] Steps: Follow up on 9/19/2022Appointment: Instructions: At 1:30 P.M.    ===    [facility] [address]  [place], [place] [id number] [phone] Steps: Follow upAppointment: Instructions: Will call with schedule after inital PCP appointment and order received from Doctor.    ==    Ergocalciferol 50,000 units weekly x 8 weeks starting Monday, July 25, 2022, then change to cholecalciferol 1,000 units twice a day.     No driving. No  alcohol.    Outpatient PT, OT, SLP    If has a gout flare, may take colchicine 0.6 mg daily for 5 days.  Hold atorvastatin while on colchicine.    Lab work-CMP and lipid panel last week of August [id number] with primary care physician for results      >30 on discharge    Alvino Melendez MD

## 2022-08-05 NOTE — PROGRESS NOTES
Inpatient Rehabilitation Plan of Care Note    Plan of Care  Care Plan Reviewed - No updates at this time.    Psychosocial    [RN] Coping/Adjustment(Active)  Current Status(08/05/2022): Expresses appropriate coping  Weekly Goal(08/09/2022): Allow opportunity to express concerns regarding current  status  Discharge Goal: Allow opportunity to express concerns regarding life changes  with ongoing support    Performed Intervention(s)  Support/peer groups  Verbalizes needs and concerns      Safety    [RN] Potential for Injury(Active)  Current Status(08/05/2022): Pt has a hx of falls  Weekly Goal(08/09/2022): Pt will use call bell 100% of the time  Discharge Goal: No injury    Performed Intervention(s)  Falls protocal  Btrm schdule every 4 hours  items within reach      Sphincter Control    [RN] Bladder Management(Active)  Current Status(08/05/2022): Pt is 100% cont of urine  Weekly Goal(08/09/2022): Remain 100% cont of urine  Discharge Goal: 100% cont of urine    [RN] Bowel Management(Active)  Current Status(08/05/2022): Continent of bowel 100%  Weekly Goal(08/09/2022): Formed BM daily  Discharge Goal: Regular bowel program    Performed Intervention(s)  Montior I and O  Stool softener as needed  Bowel program of choice  Btrm schedule    Signed by: Amanda Lopez RN

## 2022-08-09 NOTE — PROGRESS NOTES
PPS CMG Coordinator  Inpatient Rehabilitation Discharge    Mode of Locomotion: Walking.    Discharge Against Medical Advice:  No.  Discharge Information  Patient Discharged Alive:  Yes  Discharge Destination/Living Setting: Home.  At discharge, the patient was discharged to live (with) (02)  Family / Relatives    Diagnosis for Interruption/Death: ICD    Impairment Group: Stroke: 01.1 Left Body Involvement (Right Brain)    Comorbidities: ICD    Complications: ICD    QUALITY INDICATORS  Section J Health Conditions: Fall(s) Since Admission:  No    Section M. Skin Conditions Discharge:  Unhealed Pressure Ulcer(s) at Stage 1 or  Higher:  No    . Current Number of Unhealed Pressure Ulcers  Branch    Section N. Medication:  Medication Intervention: N/A - There were no potential clinically significant  medication issues identified since admission or patient is not taking any  medications.    Signed by: Monalisa Manley RN

## 2022-08-10 NOTE — PROGRESS NOTES
PPS CMG Coordinator  Inpatient Rehabilitation Admission    Ethnic Group: Black/.  Marital Status:  Marital Status: .    IRF Admission Date:  07/23/2022  Admission Class: Initial Rehab.  Admit From:  [facility]    Pre-Hospital Living: Home. Pre-Hospital Living  With: (2) Family/Relatives.    Payment Sources: Primary: Medicare Fee for Service  Secondary: Not Listed.  Impairment Group: 01.1 Left Body Involvement (Right Brain)  Date of Onset of Impairment: 07/20/2022    Etiologic Diagnosis Code(s):  Rank Code      Description  1    I63.9     Cerebral infarction, unspecified    Comorbidities:  ICD    Are there any arthritis conditions recorded for Impairment Group, Etiologic  Diagnosis, or Comorbid Conditions that meet all of the regulatory requirements  for IRF classification (in 42 .29(b)(2)(x), (xi), and xii))? No    Presence of Pressure Ulcer:  No observed/documented pressure ulcers.    MEDICAL NEEDS  Height on Admission:  72 inches.  Weight on Admission:  196 pounds.    QUALITY INDICATORS  Prior Functioning:  Self Care: Patient completed the activities by him/herself, with or without an  assistive device, with no assistance from a helper.  Indoor Mobility: Patient completed the activities by him/herself, with or  without an assistive device, with no assistance from a helper.  Stairs: Patient completed the activities by him/herself, with or without an  assistive device, with no assistance from a helper.  Functional Cognition: Patient needed partial assistance from another person to  complete activities.  Prior Device Use: Patient does not use manual or motorized wheelchair or  scooter, mechanical lift, walker, or an orthotic/prosthesis.    Bladder and Bowel: Bladder Continence: Always continent (no documented  incontinence).  Bowel Continence: Always continent (no documented incontinence).  Swallowing/Nutritional Status: Regular food (solids and liquids swallowed  safely  without supervision or modified food or liquid consistency).  Special Conditions: Patient did not receive total parenteral nutrition treatment  at the time of admission.    Section I. Active Diagnosis: Comorbidities and Co-existing Conditions:   Patient  does not have PAD, PVD, or Diabetes Mellitus  Section J. Health Conditions: Patient has not had any falls in the past year.  Patient has not had major surgery during the 100 days prior to admission.  Section M. Skin Conditions  Unhealed Pressure Ulcer/Injuries at Stage 1 or  Higher on Admission:  No.  Section N. Medication:  Potential Clinically Significant Medication Issues: No issues found during  review    Signed by: Monalisa Manley RN

## 2022-10-03 NOTE — THERAPY TREATMENT NOTE
Inpatient Rehabilitation - Speech Language Pathology Treatment Note    [facility]     Patient Name: Shiela Turk Jr.  : 1945  MRN: [id number]    Today's Date: 2022                   Admit Date: 2022       Visit Dx:      ICD-10-CM ICD-9-CM   1. Impaired gait and mobility  R26.89 781.2   2. Follow-up exam  Z09 V67.9       Patient Active Problem List   Diagnosis   • Right pontine stroke (HCC)       Past Medical History:   Diagnosis Date   • Arthritis    • Arthritis of back    • Arthritis of knee    • BPH (benign prostatic hyperplasia)    • Cancer (HCC)    • Cancer of kidney (HCC)    • COVID    • Rheumatic fever    • Stroke (HCC)        Past Surgical History:   Procedure Laterality Date   • CYST REMOVAL     • NEPHRECTOMY     • SOFT TISSUE BIOPSY         SLP Recommendation and Plan                                                   SLP EVALUATION (last 72 hours)     SLP SLC Evaluation     Row Name 22 1400 22 1000                Communication Assessment/Intervention    Document Type therapy note (daily note)  -SR therapy note (daily note)  -SR       Subjective Information no complaints  -SR no complaints  -SR       Patient Observations alert;cooperative;agree to therapy  -SR alert;cooperative;agree to therapy  -SR       Patient Effort good  -SR good  -SR       Symptoms Noted During/After Treatment none  -SR none  -SR                Pain Scale: Numbers Pre/Post-Treatment    Pretreatment Pain Rating 0/10 - no pain  -SR 5/10  -SR       Posttreatment Pain Rating 0/10 - no pain  -SR 5/10  -SR       Pain Location - -- hip  -SR             User Key  (r) = Recorded By, (t) = Taken By, (c) = Cosigned By    Initials Name Effective Dates    SR Lydia Taylor SLP 22 -                    EDUCATION    The patient has been educated in the following areas:       Cognitive Impairment.             SLP GOALS     Row Name 22 1400 22 1000 22 1600       Attention Goal 1 (SLP)     Improve Attention by Goal 1 (SLP) complete divided attention task;complete selective attention task;90%;independently (over 90% accuracy)  -SR -- --    Time Frame (Attention Goal 1, SLP) by discharge  -SR -- --    Progress/Outcomes (Attention Goal 1, SLP) goal ongoing  -SR -- --    Comment (Attention Goal 1, SLP) Four component written directions; 80% acc given min cues.  -SR -- --       Memory Skills Goal 1 (SLP)    Improve Memory Skills Through Goal 1 (SLP) recall details of the day;use memory strategies;use written schedule;listen to a paragraph and answer questions;read a paragraph and answer questions;visual memory task;select a word from a list by exclusion;repeat list in sequential order;recall details from a word list;recalling unrelated word lists with an imposed delay;recalling related word lists with an imposed delay;90%  -SR -- --    Time Frame (Memory Skills Goal 1, SLP) by discharge  -SR -- --    Progress/Outcomes (Memory Skills Goal 1, SLP) goal ongoing  -SR -- --    Comment (Memory Skills Goal 1, SLP) Immediate recall of 5 words; 30% acc independently; 90% acc given min cues  -SR -- --       Reasoning Goal 1 (SLP)    Improve Reasoning Through Goal 1 (SLP) complete basic reasoning task;complete high level reasoning task;complete deductive reasoning task;complete mental flexibility task;complete logic/creative thinking task;90%;independently (over 90% accuracy)  -SR -- --    Time Frame (Reasoning Goal 1, SLP) by discharge  -SR -- --    Progress/Outcomes (Reasoning Goal 1, SLP) goal ongoing  -SR -- --    Comment (Reasoning Goal 1, SLP) Identified incongruities on restaurant menu with 70% acc independently; 90% acc given min cues. Pt benefitted from verbal cues for attention to detail.  -SR -- --       Functional Problem Solving Skills Goal 1 (SLP)    Improve Problem Solving Through Goal 1 (SLP) -- complete organization/home management task;sequence steps in a task;determine solutions to multifactorial  problems;90%;independently (over 90% accuracy)  -SR complete organization/home management task;sequence steps in a task;determine solutions to multifactorial problems;90%;independently (over 90% accuracy)  -KA    Time Frame (Problem Solving Goal 1, SLP) -- by discharge  -SR --    Progress/Outcomes (Problem Solving Goal 1, SLP) -- goal ongoing  -SR --    Comment (Problem Solving Goal 1, SLP) -- Labeled steps in sequential order with 75% acc independently  -SR analogies 80% without cues. word deductions 70% without cues. Caregory members in thought organization task81% without cues. Inferences about paragraphs 90% without cues  -KA       Executive Functional Skills Goal 1 (SLP)    Improve Executive Function Skills Goal 1 (SLP) -- perform self-evaluation;perform self-correction;exhibit cognitive flexibility;home management activity;realistic goal setting;complex organization/planning activity;time management activity;organization/planning activity;90%;independently (over 90% accuracy)  -SR --    Time Frame (Executive Function Skills Goal 1, SLP) -- by discharge  -SR --    Progress/Outcomes (Executive Function Skills Goal 1, SLP) -- goal ongoing  -SR --    Comment (Executive Function Skills Goal 1, SLP) -- Medication management; Pt sorted 10 mock medications using pill organizer (AM and PM sections) given verbal instructions with 60% acc independently; 90% acc given min cues.  Cues were provided for placement. Pt often added extra medication to a section; showed increased awareness given verbal cues.  -SR Completed schedule task with mod to max cues, 50% without cues  -JANNA          User Key  (r) = Recorded By, (t) = Taken By, (c) = Cosigned By    Initials Name Provider Type    Anthony Corrigan MA,[facility]-SLP Speech and Language Pathologist    Lydia Arreguin, SLP Speech and Language Pathologist                            Time Calculation:        Time Calculation- SLP     Row Name 08/01/22 1502 08/01/22 1100           Time Calculation- SLP    SLP Start Time 1400  -SR 1030  -SR     SLP Stop Time 1430  -SR 1100  -SR     SLP Time Calculation (min) 30 min  -SR 30 min  -SR           User Key  (r) = Recorded By, (t) = Taken By, (c) = Cosigned By    Initials Name Provider Type    SR Lydia Taylor SLP Speech and Language Pathologist                  Therapy Charges for Today     Code Description Service Date Service Provider Modifiers Qty    88804510069 HC ST DEV OF COGN SKILLS INITIAL 15 MIN 8/1/2022 Lydia Taylor SLP  1    06512705029 HC ST DEV OF COGN SKILLS EACH ADDT'L 15 MIN 8/1/2022 Lydia Taylor SLP  3                           HEIDI Rodríguez  8/1/2022     Electrodesiccation And Curettage Text: The wound bed was treated with electrodesiccation and curettage after the biopsy was performed.

## 2024-07-20 NOTE — PROGRESS NOTES
Inpatient Rehabilitation Plan of Care Note    Plan of Care  Care Plan Reviewed - No updates at this time.    Psychosocial    [RN] Coping/Adjustment(Active)  Current Status(08/02/2022): Expresses appropriate coping  Weekly Goal(08/09/2022): Allow opportunity to express concerns regarding current  status  Discharge Goal: Allow opportunity to express concerns regarding life changes  with ongoing support    Performed Intervention(s)  Support/peer groups  Verbalizes needs and concerns      Safety    [RN] Potential for Injury(Active)  Current Status(08/02/2022): Pt has a hx of falls  Weekly Goal(08/09/2022): Pt will use call bell 100% of the time  Discharge Goal: No injury    Performed Intervention(s)  Falls protocal  Btrm schdule every 4 hours  items within reach      Sphincter Control    [RN] Bladder Management(Active)  Current Status(08/02/2022): Pt is 100% cont of urine  Weekly Goal(08/09/2022): Remain 100% cont of urine  Discharge Goal: 100% cont of urine    [RN] Bowel Management(Active)  Current Status(08/02/2022): Continent of bowel 100%  Weekly Goal(08/09/2022): Formed BM daily  Discharge Goal: Regular bowel program    Performed Intervention(s)  Montior I and O  Stool softener as needed  Bowel program of choice  Btrm schedule    Signed by: Jaleesa Ambrose RN     General